# Patient Record
Sex: FEMALE | Race: WHITE | NOT HISPANIC OR LATINO | URBAN - METROPOLITAN AREA
[De-identification: names, ages, dates, MRNs, and addresses within clinical notes are randomized per-mention and may not be internally consistent; named-entity substitution may affect disease eponyms.]

---

## 2017-02-27 DIAGNOSIS — E11.65 TYPE 2 DIABETES MELLITUS WITH HYPERGLYCEMIA, WITHOUT LONG-TERM CURRENT USE OF INSULIN (HCC): ICD-10-CM

## 2017-02-27 RX ORDER — GLIPIZIDE 5 MG/1
5 TABLET ORAL 2 TIMES DAILY
Qty: 60 TAB | Refills: 6 | Status: SHIPPED | OUTPATIENT
Start: 2017-02-27 | End: 2017-02-27 | Stop reason: SDUPTHER

## 2017-02-27 RX ORDER — GLIPIZIDE 5 MG/1
5 TABLET ORAL 2 TIMES DAILY
Qty: 180 TAB | Refills: 3 | Status: SHIPPED | OUTPATIENT
Start: 2017-02-27 | End: 2017-09-29

## 2017-04-18 ENCOUNTER — OFFICE VISIT (OUTPATIENT)
Dept: ENDOCRINOLOGY | Facility: MEDICAL CENTER | Age: 52
End: 2017-04-18
Payer: MEDICAID

## 2017-04-18 VITALS
BODY MASS INDEX: 30.21 KG/M2 | HEART RATE: 82 BPM | WEIGHT: 215.8 LBS | DIASTOLIC BLOOD PRESSURE: 82 MMHG | OXYGEN SATURATION: 96 % | HEIGHT: 71 IN | SYSTOLIC BLOOD PRESSURE: 128 MMHG

## 2017-04-18 DIAGNOSIS — I10 ESSENTIAL HYPERTENSION: ICD-10-CM

## 2017-04-18 DIAGNOSIS — E66.9 OBESITY (BMI 30-39.9): ICD-10-CM

## 2017-04-18 DIAGNOSIS — E11.9 TYPE 2 DIABETES MELLITUS WITHOUT COMPLICATION, WITHOUT LONG-TERM CURRENT USE OF INSULIN (HCC): ICD-10-CM

## 2017-04-18 LAB
HBA1C MFR BLD: 5.7 % (ref ?–5.8)
INT CON NEG: NORMAL
INT CON POS: NORMAL

## 2017-04-18 PROCEDURE — 83036 HEMOGLOBIN GLYCOSYLATED A1C: CPT | Performed by: INTERNAL MEDICINE

## 2017-04-18 PROCEDURE — 99214 OFFICE O/P EST MOD 30 MIN: CPT | Performed by: INTERNAL MEDICINE

## 2017-04-18 NOTE — ADDENDUM NOTE
Addended by: MARIA DEL CARMEN OLMOS on: 4/18/2017 10:27 AM     Modules accepted: Mariana Carrasquillo

## 2017-04-18 NOTE — PROGRESS NOTES
Endocrinology Clinic Progress Note  PCP: Cristy Allen PA-C    CC:  Diabetes    HPI:  Thomas Shah is a 51 y.o. old patient who comes in today for routine follow up.     Type 2 diabetes: She is currently on Tradjenta 5 mg daily. Also takes glipizide 5 mg in the morning, she eats a very small breakfast and tends to have low blood sugar symptoms within a few hours of taking glipizide. She reports compliance with medications. She has been physically much more active than before.    Hypertension: Blood pressure is well controlled. She is on ARB.    Obesity: BMI greater than 30. She is currently not following diet and lifestyle modification. She has gained a few pounds since last visit.    ROS:  Constitutional: Positive for weight gain  Endo: Denies excessive thirst or frequent urination    Past Medical History:  Patient Active Problem List    Diagnosis Date Noted   • Smoking addiction 07/23/2013     Priority: High     Class: Acute   • Lung abnormality 07/23/2013     Priority: High     Class: Acute   • EMPHYSEMA 07/23/2013     Priority: High     Class: Acute   • Diabetes mellitus with lactic acidosis without coma (CMS-[facility]) 12/05/2016   • BMI 31.0-31.9,adult 10/14/2013   • HTN (hypertension) 09/18/2013   • History of alcoholism (CMS-HCC) 08/29/2013   • Snoring 08/01/2013   • Hypertension 08/01/2013   • Insomnia with sleep apnea 08/01/2013       Medications:    Current outpatient prescriptions:   •  glipiZIDE (GLUCOTROL) 5 MG Tab, Take 1 Tab by mouth 2 times a day., Disp: 180 Tab, Rfl: 3  •  linagliptin (TRADJENTA) 5 MG Tab tablet, Take 1 Tab by mouth every day., Disp: 90 Tab, Rfl: 2  •  Blood Glucose Monitoring Suppl SUPPLIES Misc, Test strips for meter. Sig: use four times daily ssx high or low sugar #100 RF x 3, Disp: 100 Each, Rfl: 6  •  Lancets Misc, Lancets order: Lancets for meter. Sig: use four times daily ssx high or low sugar. #100 RF x 3, Disp: 100 Each, Rfl: 5  •  Blood Glucose Monitoring Suppl  Device, Meter: Dispense Device of Insurance Preference. Sig. Use as directed for blood sugar monitoring. #1. NR., Disp: 1 Device, Rfl: 0  •  amlodipine (NORVASC) 10 MG Tab, Take 1 Tab by mouth every day., Disp: 30 Tab, Rfl: 11  •  albuterol (ACCUNEB) 0.63 MG/3ML nebulizer solution, 3 mL by Nebulization route every four hours as needed for Shortness of Breath., Disp: 75 mL, Rfl: 0  •  Blood Glucose Monitoring Suppl (ONE TOUCH ULTRA 2) W/DEVICE Kit, , Disp: , Rfl: 0  •  ONE TOUCH ULTRA TEST strip, , Disp: , Rfl: 5  •  fluticasone-salmeterol (ADVAIR HFA) 230-21 MCG/ACT inhaler, Inhale 2 Puffs by mouth 2 times a day., Disp: 12 g, Rfl: 3  •  losartan (COZAAR) 100 MG Tab, Take 100 mg by mouth every day., Disp: , Rfl:     Labs:   Results for JUSTIN QUILES (MRN [id number]) as of 4/18/2017 10:07   Ref. Range 3/15/2017 10:06 4/13/2017 11:30   Sodium Latest Ref Range: 136-145 mmol/L 139    Potassium Latest Ref Range: 3.5-5.1 mmol/L 4.2    Chloride Latest Ref Range:  mmol/L 104    Co2 Latest Ref Range: 21-32 mmol/L 25    Anion Gap Latest Ref Range: 10-18 mmol/L 14    Glucose Latest Ref Range: 74-99 mg/dL 126 (H)    Bun Latest Ref Range: 7-18 mg/dL 17    Creatinine Latest Ref Range: 0.6-1.0 mg/dL 0.8    GFR If  Latest Ref Range: >60 mL/min/1.73 m 2 >60    GFR If Non  Latest Ref Range: >60 mL/min/1.73 m 2 >60    Calcium Latest Ref Range: 8.5-11.0 mg/dL 9.3    Cholesterol,Tot Latest Ref Range: 120-200 mg/dL 177    Triglycerides Latest Ref Range: 0-150 mg/dL 174 (H)    HDL Latest Ref Range: 40.0-60.0 mg/dL 47.0    Non HDL Cholesterol Latest Ref Range:   130    LDL Latest Ref Range: <100 mg/dL 95    Chol-Hdl Ratio Unknown 3.77    Misc Reference Test Ambient Unknown  See Below   Misc Referent Test Frozen Unknown See Below    Misc Reference Test Refrigerated Unknown See Below    C-Peptide Latest Ref Range: 0.80-3.85 ng/mL 3.37    TSH Latest Ref Range: 0.34-4.82 uIU/mL 1.91    Free T-4  "Latest Ref Range: 0.77-1.61 ng/dL 0.83      Physical Examination:  Vital signs: /82 mmHg  Pulse 82  Ht 1.803 m (5' 11\")  Wt 97.886 kg (215 lb 12.8 oz)  BMI 30.11 kg/m2  SpO2 96%  LMP 12/26/2014  General: No apparent distress, cooperative  Eyes: No scleral icterus, no discharge, normal eyelids  Neck: No abnormal masses on inspection   Resp: Normal effort, clear to auscultation bilaterally  CVS: Regular rate and rhythm, S1 S2 normal, no murmur  Extremities: No lower extremity edema  Musculoskeletal: Normal digits and nails  Skin: No rash on visible skin  Psych: Alert and oriented, normal mood and affect    Assessment and Plan:    1. Type 2 diabetes mellitus without complication, without long-term current use of insulin (CMS-[facility])  · Hemoglobin A1c today in the clinic is 5.7%  · Goal A1c less than 7%  · Due to hypoglycemia she will discontinue glipizide for now and continue Tradjenta 5 mg daily  · If blood sugars are running persistently higher than 150 she will resume glipizide at a lower dose of 2.5 mg but rather than taking it in the morning she will take it with the largest meal of the day  · Repeat labs in 6 months  - HEMOGLOBIN A1C; Future  - BASIC METABOLIC PANEL; Future    2. Essential hypertension  · Blood pressure is well controlled  · Continue ARB    3. Obesity (BMI 30-39.9)  · We discussed about the importance of diet and lifestyle modification and benefits of achieving normal BMI    Return in about 6 months (around 10/18/2017).    Thank you for allowing me to participate in the care of this patient.    Elizabeth Nicholson M.D.    CC:   Cristy Allen PA-C    This note was created using voice recognition software (Dragon). The accuracy of the dictation is limited by the abilities of the software. I have reviewed the note prior to signing, however some errors in grammar and context are still possible. If you have any questions related to this note please do not hesitate to contact our office. "

## 2017-04-18 NOTE — MR AVS SNAPSHOT
"Thomas Verdin Pablo   2017 10:00 AM   Office Visit   MRN: [id number]    Department:  Endocrinology [facility]   Dept Phone:  [phone]    Description:  Female : 1965   Provider:  Elizabeth Nicholson M.D.           Reason for Visit     Follow-Up Diabetes      Allergies as of 2017     No Known Allergies      You were diagnosed with     Type 2 diabetes mellitus without complication, without long-term current use of insulin (CMS-HCC)   [1044975]         Vital Signs     Blood Pressure Pulse Height Weight Body Mass Index Oxygen Saturation    128/82 mmHg 82 1.803 m (5' 11\") 97.886 kg (215 lb 12.8 oz) 30.11 kg/m2 96%    Last Menstrual Period Smoking Status                2014 Current Every Day Smoker          Basic Information     Date Of Birth Sex Race Ethnicity Preferred Language    1965 Female White Non- English      Your appointments     Oct 18, 2017  9:20 AM   Established Patient with Elizabeth Nicholson M.D.   [facility]    [address], [address]  [facility] [id number] [phone]           You will be receiving a confirmation call a few days before your appointment from our automated call confirmation system.              Problem List              ICD-10-CM Priority Class Noted - Resolved    Smoking addiction F17.200 High Acute 2013 - Present    Lung abnormality J98.4 High Acute 2013 - Present    EMPHYSEMA  High Acute 2013 - Present    Snoring R06.83   2013 - Present    Hypertension I10   2013 - Present    Insomnia with sleep apnea G47.00, G47.30   2013 - Present    History of alcoholism (CMS-HCC) F10.21   2013 - Present    HTN (hypertension) I10   2013 - Present    BMI 31.0-31.9,adult Z68.31   10/14/2013 - Present    Diabetes mellitus with lactic acidosis without coma (CMS-HCC) E13.10   2016 - Present      Health Maintenance        Date Due Completion Dates    IMM HEP B VACCINE (1 of 3 - " Primary Series) 1965 ---    DIABETES MONOFILAMENT / LE EXAM 2/8/1965 ---    URINE ACR / MICROALBUMIN 8/8/1982 ---    IMM DTaP/Tdap/Td Vaccine (1 - Tdap) 8/8/1983 ---    IMM PNEUMOCOCCAL 19-64 (ADULT) MEDIUM RISK SERIES (1 of 1 - PPSV23) 8/8/1983 ---    COLONOSCOPY 8/8/2014 ---    PAP SMEAR 11/7/2017 (Originally 8/8/1985) ---    A1C SCREENING 6/8/2017 12/8/2016, 11/8/2016, 1/11/2016, 1/11/2016    MAMMOGRAM 11/11/2017 11/11/2016, 12/16/2014    RETINAL SCREENING 11/16/2017 11/16/2016 (Done)    Override on 11/16/2016: Done (20/20 both eyes. F/u 1 year with Trisha Cha OD)    FASTING LIPID PROFILE 3/15/2018 3/15/2017, 11/19/2015    SERUM CREATININE 3/15/2018 3/15/2017, 11/21/2016, 11/7/2016, 11/19/2015, 6/3/2014, 1/12/2014            Current Immunizations     Influenza TIV (IM) 10/14/2013    Influenza Vaccine Quad Inj (Pf) 10/26/2016      Below and/or attached are the medications your provider expects you to take. Review all of your home medications and newly ordered medications with your provider and/or pharmacist. Follow medication instructions as directed by your provider and/or pharmacist. Please keep your medication list with you and share with your provider. Update the information when medications are discontinued, doses are changed, or new medications (including over-the-counter products) are added; and carry medication information at all times in the event of emergency situations     Allergies:  No Known Allergies          Medications  Valid as of: April 18, 2017 - 10:03 AM    Generic Name Brand Name Tablet Size Instructions for use    Albuterol Sulfate (Nebu Soln) ACCUNEB 0.63 MG/3ML 3 mL by Nebulization route every four hours as needed for Shortness of Breath.        AmLODIPine Besylate (Tab) NORVASC 10 MG Take 1 Tab by mouth every day.        Azithromycin (Tab) ZITHROMAX 250 MG Use as directed        Blood Glucose Monitoring Suppl (Device) Blood Glucose Monitoring Suppl  Meter: Dispense Device of Insurance  Preference. Sig. Use as directed for blood sugar monitoring. #1. NR.        Blood Glucose Monitoring Suppl (Misc) Blood Glucose Monitoring Suppl SUPPLIES Test strips for meter. Sig: use four times daily ssx high or low sugar #100 RF x 3        Blood Glucose Monitoring Suppl (Kit) ONE TOUCH ULTRA 2 W/DEVICE         Doxycycline Hyclate (Tab) VIBRAMYCIN 100 MG Take 1 Tab by mouth 2 times a day.        Fluticasone-Salmeterol (Aerosol) ADVAIR -21 MCG/ACT Inhale 2 Puffs by mouth 2 times a day.        GlipiZIDE (Tab) GLUCOTROL 5 MG Take 1 Tab by mouth 2 times a day.        Glucose Blood (Strip) ONE TOUCH ULTRA TEST          Hydrocodone-Acetaminophen (Tab) NORCO 5-325 MG Take 1-2 Tabs by mouth every four hours as needed.        Lancets (Misc) Lancets  Lancets order: Lancets for meter. Sig: use four times daily ssx high or low sugar. #100 RF x 3        Linagliptin (Tab) TRADJENTA 5 MG Take 1 Tab by mouth every day.        Losartan Potassium (Tab) COZAAR 100 MG Take 100 mg by mouth every day.        Methocarbamol (Tab) ROBAXIN 500 MG Take 2 Tabs by mouth 3 times a day.        Omeprazole (CAPSULE DELAYED RELEASE) PRILOSEC 20 MG Take 1 Cap by mouth every day.        Ondansetron HCl (Tab) ZOFRAN 4 MG Take 1 Tab by mouth every 8 hours as needed for Nausea/Vomiting.        PredniSONE (Tab) DELTASONE 20 MG 3 PO daily for five days        Promethazine-Codeine (Syrup) PHENERGAN-CODEINE 6.25-10 MG/5ML Take 5 mL by mouth 4 times a day as needed for Cough.        .                 Medicines prescribed today were sent to:     [facility] #[id number] - [place], [place] - [address]    [address]  [facility] [address]    Phone: [phone] Fax: [phone]    Open 24 Hours?: No    [facility] - [place], [place] - [address]    [address] [place] [place] [address]    Phone: [phone] Fax: [phone]    Open 24 Hours?: No      Medication refill instructions:       If your prescription bottle indicates  you have medication refills left, it is not necessary to call your provider’s office. Please contact your pharmacy and they will refill your medication.    If your prescription bottle indicates you do not have any refills left, you may request refills at any time through one of the following ways: The online [employer] system (except Urgent Care), by calling your provider’s office, or by asking your pharmacy to contact your provider’s office with a refill request. Medication refills are processed only during regular business hours and may not be available until the next business day. Your provider may request additional information or to have a follow-up visit with you prior to refilling your medication.   *Please Note: Medication refills are assigned a new Rx number when refilled electronically. Your pharmacy may indicate that no refills were authorized even though a new prescription for the same medication is available at the pharmacy. Please request the medicine by name with the pharmacy before contacting your provider for a refill.        Your To Do List     Future Labs/Procedures Complete By Expires    BASIC METABOLIC PANEL  As directed 4/18/2019    HEMOGLOBIN A1C  As directed 4/18/2018         [employer] Status: Patient Declined        Quit Tobacco Information     Do you want to quit using tobacco?    Quitting tobacco decreases risks of cancer, heart and lung disease, increases life expectancy, improves sense of taste and smell, and increases spending money, among other benefits.    If you are thinking about quitting, we can help.  • [facility] Quit Tobacco Program: 656.368.7672  o Program occurs weekly for four weeks and includes pharmacist consultation on products to support quitting smoking or chewing tobacco. A provider referral is needed for pharmacist consultation.  • Tobacco Users Help Hotline: 1-141-QUIT-NOW (456-0730) or https://[place].quitlogix.org/  o Free, confidential telephone and online coaching for  [facility]. Sessions are designed on a schedule that is convenient for you. Eligible clients receive free nicotine replacement therapy.  • Nationally: www.smokefree.gov  o Information and professional assistance to support both immediate and long-term needs as you become, and remain, a non-smoker. Smokefree.gov allows you to choose the help that best fits your needs.

## 2017-09-29 ENCOUNTER — HOSPITAL ENCOUNTER (OUTPATIENT)
Dept: LAB | Facility: MEDICAL CENTER | Age: 52
End: 2017-09-29
Attending: INTERNAL MEDICINE
Payer: MEDICAID

## 2017-09-29 ENCOUNTER — TELEPHONE (OUTPATIENT)
Dept: ENDOCRINOLOGY | Facility: MEDICAL CENTER | Age: 52
End: 2017-09-29

## 2017-09-29 ENCOUNTER — OFFICE VISIT (OUTPATIENT)
Dept: ENDOCRINOLOGY | Facility: MEDICAL CENTER | Age: 52
End: 2017-09-29
Payer: MEDICAID

## 2017-09-29 VITALS
DIASTOLIC BLOOD PRESSURE: 72 MMHG | BODY MASS INDEX: 30.85 KG/M2 | SYSTOLIC BLOOD PRESSURE: 118 MMHG | WEIGHT: 220.4 LBS | HEIGHT: 71 IN | HEART RATE: 97 BPM | OXYGEN SATURATION: 100 %

## 2017-09-29 DIAGNOSIS — E11.9 TYPE 2 DIABETES MELLITUS WITHOUT COMPLICATION, WITHOUT LONG-TERM CURRENT USE OF INSULIN (HCC): ICD-10-CM

## 2017-09-29 DIAGNOSIS — E78.1 HYPERTRIGLYCERIDEMIA: ICD-10-CM

## 2017-09-29 DIAGNOSIS — E66.9 OBESITY (BMI 30.0-34.9): ICD-10-CM

## 2017-09-29 DIAGNOSIS — I10 ESSENTIAL HYPERTENSION: ICD-10-CM

## 2017-09-29 LAB
ALBUMIN SERPL BCP-MCNC: 4.1 G/DL (ref 3.2–4.9)
ALP SERPL-CCNC: 93 U/L (ref 30–99)
ALT SERPL-CCNC: 45 U/L (ref 2–50)
AST SERPL-CCNC: 26 U/L (ref 12–45)
BILIRUB CONJ SERPL-MCNC: 0.2 MG/DL (ref 0.1–0.5)
BILIRUB INDIRECT SERPL-MCNC: 0.7 MG/DL (ref 0–1)
BILIRUB SERPL-MCNC: 0.9 MG/DL (ref 0.1–1.5)
PROT SERPL-MCNC: 6.8 G/DL (ref 6–8.2)

## 2017-09-29 PROCEDURE — 80076 HEPATIC FUNCTION PANEL: CPT

## 2017-09-29 PROCEDURE — 36415 COLL VENOUS BLD VENIPUNCTURE: CPT

## 2017-09-29 PROCEDURE — 99214 OFFICE O/P EST MOD 30 MIN: CPT | Performed by: INTERNAL MEDICINE

## 2017-09-29 RX ORDER — ATORVASTATIN CALCIUM 20 MG/1
20 TABLET, FILM COATED ORAL
Qty: 90 TAB | Refills: 3 | Status: SHIPPED | OUTPATIENT
Start: 2017-09-29 | End: 2017-10-20

## 2017-09-29 NOTE — TELEPHONE ENCOUNTER
Please inform patient that liver enzymes labs are not back yet. However as her insurance changes end of this month I am just sending a prescription for Lipitor now, she should  the Lipitor prescription by tomorrow but do not start Lipitor yet. We will contact her on Monday after reviewing lab results.

## 2017-09-29 NOTE — PROGRESS NOTES
"Endocrinology Clinic Progress Note    CC: Diabetes     HPI:  Thomas Shah is a 52 y.o. old patient who comes in today for routine follow up.     Type 2 diabetes: She is currently on Tradjenta 5 mg daily. She is off glipizide for several months. She has not been checking blood sugars as often. Denies hypoglycemic symptoms.     Hypertension: Blood pressure is well controlled. She is on ARB.    Obesity: She has gained a few pounds since last visit. She is to drinking 3 cans of regular soda and eating potato chips every day for lunch.    ROS:  Constitutional: Positive for weight gain  Endo: Denies excessive thirst or frequent urination    PMH:  Type 2 diabetes  Hypertriglyceridemia  Hypertension  Obesity    EXAM:  Vital signs: /72   Pulse 97   Ht 1.803 m (5' 11\")   Wt 100 kg (220 lb 6.4 oz)   LMP 12/26/2014   SpO2 100%   BMI 30.74 kg/m²   General: No apparent distress, cooperative  Eyes: No scleral icterus, no discharge  Neck: Normal on external inspection  Resp: Normal effort, clear to auscultation bilaterally  CVS: Regular rate and rhythm, S1 S2 normal  Musculoskeletal: Normal gait  Extremities: No lower extremity edema  Skin: No rash on visible skin  Psych: Alert and oriented, normal mood and affect    Assessment and Plan:    1. Type 2 diabetes mellitus without complication, without long-term current use of insulin (CMS-[facility])  · Hemoglobin A1c today in the clinic is 6.3%  · Goal hemoglobin A1c less than 7%  · Continue tradjenta  · Discussed about the pros and cons of statins, she has history of elevated LFTs, we will check LFTs now and then again in 2 months after starting statin (depending upon LFTs now we will start statin)  - BASIC METABOLIC PANEL; Future  - TSH WITH REFLEX TO FT4; Future  - MICROALBUMIN CREAT RATIO URINE; Future  - LIPID PROFILE; Future  - HEMOGLOBIN A1C; Future  - HEPATIC FUNCTION PANEL; Future  - HEPATIC FUNCTION PANEL; Future    2. Essential hypertension  · Blood pressure is " well controlled  · Continue ARB    3. Obesity (BMI 30.0-34.9)  · We again discussed about the importance of weight loss and maintaining normal BMI    Return in about 6 months (around 3/29/2018).    Thank you for allowing me to participate in the care of this patient.    Elizabeth Nicholson M.D.    CC:   Cristy Allen P.A.-C.    This note was created using voice recognition software (Dragon). The accuracy of the dictation is limited by the abilities of the software. I have reviewed the note prior to signing, however some errors in grammar and context are still possible. If you have any questions related to this note please do not hesitate to contact our office.

## 2017-10-03 ENCOUNTER — TELEPHONE (OUTPATIENT)
Dept: ENDOCRINOLOGY | Facility: MEDICAL CENTER | Age: 52
End: 2017-10-03

## 2017-10-03 NOTE — TELEPHONE ENCOUNTER
----- Message from Elizabeth Nicholson M.D. sent at 10/3/2017 10:30 AM PDT -----    Called Pt and notified    Please inform patient that liver enzymes are normal. She can start cholesterol medication. Repeat labs in one month.    Thank you  Pat

## 2017-11-08 ENCOUNTER — TELEPHONE (OUTPATIENT)
Dept: ENDOCRINOLOGY | Facility: MEDICAL CENTER | Age: 52
End: 2017-11-08

## 2017-11-08 DIAGNOSIS — E11.65 TYPE 2 DIABETES MELLITUS WITH HYPERGLYCEMIA, WITHOUT LONG-TERM CURRENT USE OF INSULIN (HCC): ICD-10-CM

## 2017-11-08 NOTE — TELEPHONE ENCOUNTER
Pt called and she mentioned that her Insurance cost her to pay 300 dollars for her Tradjenta and she can't afford.    Pt need to know if you could change it with other medication.    Pt cant be on Metformin due to side effect.    Pls Advise    Thank you  Pat

## 2017-11-09 NOTE — TELEPHONE ENCOUNTER
We have 2 options: one, we can discontinue Tradjenta and resume glipizide but at a lower dose of 2.5 mg daily 30 minutes before breakfast. And the second option would be for her to call her insurance company to obtain a list of covered diabetes medications and then we can choose from the available options.

## 2017-12-18 ENCOUNTER — TELEPHONE (OUTPATIENT)
Dept: ENDOCRINOLOGY | Facility: MEDICAL CENTER | Age: 52
End: 2017-12-18

## 2017-12-18 DIAGNOSIS — R25.2 MUSCLE CRAMPS: ICD-10-CM

## 2017-12-18 NOTE — TELEPHONE ENCOUNTER
Patient notified, demonstrated understanding and advised her to go to urgent care or ED for further evaluation of her symptoms if she's in a lot of pain.

## 2017-12-18 NOTE — TELEPHONE ENCOUNTER
Pt called and she mentioned that she had a bad jesse horse on her ankle and can't walk.  Sharp pain from her feet to her legs.    Pt was on Tradjenta 5mg once a day and she mentioned that she don't have neuropathy.    Pt next appt to see you will be on 4/2/18.    Thank you  Pat

## 2017-12-18 NOTE — TELEPHONE ENCOUNTER
Tradjenta should not cause muscle cramps. However I have ordered some labs, I would recommend to do those labs as soon as she can.

## 2017-12-28 ENCOUNTER — TELEPHONE (OUTPATIENT)
Dept: ENDOCRINOLOGY | Facility: MEDICAL CENTER | Age: 52
End: 2017-12-28

## 2017-12-28 DIAGNOSIS — E11.65 TYPE 2 DIABETES MELLITUS WITH HYPERGLYCEMIA, WITHOUT LONG-TERM CURRENT USE OF INSULIN (HCC): ICD-10-CM

## 2017-12-28 RX ORDER — METFORMIN HYDROCHLORIDE 500 MG/1
1000 TABLET, EXTENDED RELEASE ORAL DAILY
Qty: 360 TAB | Refills: 1 | Status: SHIPPED | OUTPATIENT
Start: 2017-12-28 | End: 2018-01-08

## 2017-12-28 NOTE — TELEPHONE ENCOUNTER
Pt is requesting a refill for Metformin instead of Trajenta due to copay being $300. She has been out of medication for a few days now and would like a 90 day supply if possible. Has not been feeling well.

## 2018-01-05 ENCOUNTER — TELEPHONE (OUTPATIENT)
Dept: ENDOCRINOLOGY | Facility: MEDICAL CENTER | Age: 53
End: 2018-01-05

## 2018-01-05 DIAGNOSIS — E11.9 TYPE 2 DIABETES MELLITUS WITHOUT COMPLICATION, WITHOUT LONG-TERM CURRENT USE OF INSULIN (HCC): ICD-10-CM

## 2018-01-05 NOTE — TELEPHONE ENCOUNTER
Pt states that since starting Metformin she has been having diarrhea, nausea and stomach cramps. Would like to know if there is another alternative?

## 2018-01-08 RX ORDER — GLIPIZIDE 5 MG/1
2.5 TABLET ORAL DAILY
Qty: 45 TAB | Refills: 1 | Status: SHIPPED | OUTPATIENT
Start: 2018-01-08 | End: 2018-01-22 | Stop reason: SDUPTHER

## 2018-01-08 NOTE — TELEPHONE ENCOUNTER
In that the case we can discontinue metformin. Previously she was on glipizide, she had low blood sugars with 5 mg of glipizide once a day in the morning with breakfast. So we can start with half a tablet, 2.5 mg glipizide with breakfast.

## 2018-01-11 NOTE — TELEPHONE ENCOUNTER
Yes, please advise her to increase the dose to 5 mg with breakfast. If blood sugars are still high I would recommend to add another 5 mg glipizide 30 minutes before dinner as well. Also advise her to call us with blood sugar readings on Monday.

## 2018-01-22 RX ORDER — GLIPIZIDE 5 MG/1
TABLET ORAL
Qty: 90 TAB | Refills: 6 | Status: SHIPPED | OUTPATIENT
Start: 2018-01-22 | End: 2018-04-23

## 2018-01-22 NOTE — TELEPHONE ENCOUNTER
Please advise her to increase the dose of glipizide to 10 mg with dinner, and continue 5 mg with breakfast. Call us with blood sugar readings in one week. New prescription sent.

## 2018-04-19 ENCOUNTER — TELEPHONE (OUTPATIENT)
Dept: ENDOCRINOLOGY | Facility: MEDICAL CENTER | Age: 53
End: 2018-04-19

## 2018-04-19 NOTE — TELEPHONE ENCOUNTER
Patient states she has been feeling very tired and sick to her stomach for the past couple of days, states unable to get out of bed before yesterday 4/18/18.  States paramedics called and she had blood sugars greater than 300, refused to go to hospital due to no insurance.  States reinstated on Medicaid yesterday and wants to go back to taking Tradjenta as she states it was the only thing the helped control her blood sugars in the past.

## 2018-05-18 ENCOUNTER — OFFICE VISIT (OUTPATIENT)
Dept: ENDOCRINOLOGY | Facility: MEDICAL CENTER | Age: 53
End: 2018-05-18
Payer: MEDICAID

## 2018-05-18 VITALS
BODY MASS INDEX: 30.94 KG/M2 | DIASTOLIC BLOOD PRESSURE: 78 MMHG | OXYGEN SATURATION: 93 % | WEIGHT: 221 LBS | HEIGHT: 71 IN | SYSTOLIC BLOOD PRESSURE: 122 MMHG | HEART RATE: 107 BPM

## 2018-05-18 DIAGNOSIS — R20.2 TINGLING OF FACE: ICD-10-CM

## 2018-05-18 DIAGNOSIS — R20.0 NUMBNESS OF TONGUE: ICD-10-CM

## 2018-05-18 DIAGNOSIS — E11.9 TYPE 2 DIABETES MELLITUS WITHOUT COMPLICATION, WITHOUT LONG-TERM CURRENT USE OF INSULIN (HCC): ICD-10-CM

## 2018-05-18 DIAGNOSIS — I10 ESSENTIAL HYPERTENSION: ICD-10-CM

## 2018-05-18 DIAGNOSIS — E03.8 SUBCLINICAL HYPOTHYROIDISM: ICD-10-CM

## 2018-05-18 PROCEDURE — 99214 OFFICE O/P EST MOD 30 MIN: CPT | Performed by: INTERNAL MEDICINE

## 2018-05-18 RX ORDER — LEVOTHYROXINE SODIUM 0.07 MG/1
75 TABLET ORAL
Qty: 90 TAB | Refills: 2 | Status: SHIPPED | OUTPATIENT
Start: 2018-05-18 | End: 2019-04-22 | Stop reason: SDUPTHER

## 2018-05-18 RX ORDER — LOSARTAN POTASSIUM AND HYDROCHLOROTHIAZIDE 12.5; 5 MG/1; MG/1
1 TABLET ORAL DAILY
COMMUNITY
End: 2021-09-08

## 2018-05-18 NOTE — PROGRESS NOTES
"Endocrinology Clinic Progress Note    CC: Type 2 diabetes    HPI:  Thomas Shah is a 52 y.o. old patient who comes in today for routine follow up.     Type 2 diabetes: She is currently on Tradjenta 5 mg daily. She has not been checking blood sugars at home lately. Her hemoglobin A1c earlier this month was 6.7%. Her hemoglobin A1c over the past 2 years has been below 7%. She is trying to limit carbohydrate intake. She still drinks 2 regular sodas every day.     Subclinical hypothyroidism: Recent labs showed slightly elevated TSH. She complains of difficulty losing weight. She feels tired.    Hypertension: Blood pressure is well controlled. She is on ARB.    Numbness/tingling of lips/tongue: For the past month she has been experiencing significant numbness of left side of her tongue, left upper and lower lips. Recently she had issues with blurry vision in the right eye for several minutes, which resolved on its own. She also complains of burning pain in her feet.    ROS:  Constitutional: No unintentional weight loss  Endo: Denies excessive thirst or frequent urination    PMH:  Patient Active Problem List   Diagnosis   • Smoking addiction   • Lung abnormality   • EMPHYSEMA   • Snoring   • Hypertension   • Insomnia with sleep apnea   • History of alcoholism (HCC)   • HTN (hypertension)   • BMI 31.0-31.9,adult   • Diabetes mellitus with lactic acidosis without coma (HCC)     EXAM:  Vital signs: /78   Pulse (!) 107   Ht 1.803 m (5' 11\")   Wt 100.2 kg (221 lb)   LMP 12/26/2014   SpO2 93%   BMI 30.82 kg/m²   General: No apparent distress, cooperative  Eyes: No scleral icterus, no discharge  Neck: Normal on external inspection  Resp: Normal effort, clear to auscultation bilaterally  CVS: Regular rate and rhythm, S1 S2 normal  Musculoskeletal: Normal gait  Extremities: No lower extremity edema  Skin: No rash on visible skin  Psych: Alert and oriented, normal mood and affect  Feet: Dry skin, skin intact, " slightly impaired sensation to monofilament testing    Assessment and Plan:    1. Type 2 diabetes mellitus without complication, without long-term current use of insulin (HCC)  · Hemoglobin A1c earlier this month was 6.7%  · Goal A1c less than 7%  · Continue Tradjenta 5 mg daily  · Check blood sugars at least a few times a week  · We again discussed about benefits of weight loss, we discussed about limiting carbohydrate intake, we discussed about avoiding sugary beverages    2. Essential hypertension  · Blood pressure is well controlled  · Continue ARB    3. Subclinical hypothyroidism  · Started levothyroxine 75 µg daily  · Repeat labs in 6 weeks  - TSH; Future  - FREE THYROXINE; Future  - levothyroxine (SYNTHROID) 75 MCG Tab; Take 1 Tab by mouth Every morning on an empty stomach.  Dispense: 90 Tab; Refill: 2    4. Tingling of face  - REFERRAL TO NEUROLOGY    5. Numbness of tongue  - REFERRAL TO NEUROLOGY    Return in about 4 months (around 9/18/2018).    Thank you for allowing me to participate in the care of this patient.    Elizabeth Nicholson M.D.    CC:   Cristy Allen P.A.-C.    This note was created using voice recognition software (Dragon). The accuracy of the dictation is limited by the abilities of the software. I have reviewed the note prior to signing, however some errors in grammar and context are still possible. If you have any questions related to this note please do not hesitate to contact our office.

## 2018-06-05 ENCOUNTER — TELEPHONE (OUTPATIENT)
Dept: ENDOCRINOLOGY | Facility: MEDICAL CENTER | Age: 53
End: 2018-06-05

## 2018-06-05 NOTE — TELEPHONE ENCOUNTER
Referral to Neurology was not accepted according to pt. Says they are not accepting new pt's at this time. Pt would like to know if there are any other neurologists she can be referred to? Please advise.

## 2018-06-06 NOTE — TELEPHONE ENCOUNTER
Can you please ask which neurology clinic she received the call from; I see that referral was recently sent to [facility].      Referral information sent to the following:  Neurology     [facility]  Dr Shruthi Ewing  [id number]  [facility] [address]  Phone: [phone]  Fax: [phone]

## 2018-06-12 NOTE — TELEPHONE ENCOUNTER
Yes, that was the referral she was talking about. Says they dont accept medicaid and not taking new patients at this time. Would like to know if there is anyone she can see for the tingling and numbness in legs and lips meanwhile? Please advise.

## 2018-06-28 ENCOUNTER — HOSPITAL ENCOUNTER (OUTPATIENT)
Dept: LAB | Facility: MEDICAL CENTER | Age: 53
End: 2018-06-28
Attending: INTERNAL MEDICINE
Payer: MEDICAID

## 2018-06-28 ENCOUNTER — OFFICE VISIT (OUTPATIENT)
Dept: ENDOCRINOLOGY | Facility: MEDICAL CENTER | Age: 53
End: 2018-06-28
Payer: MEDICAID

## 2018-06-28 VITALS
WEIGHT: 212.6 LBS | BODY MASS INDEX: 29.76 KG/M2 | SYSTOLIC BLOOD PRESSURE: 122 MMHG | HEIGHT: 71 IN | DIASTOLIC BLOOD PRESSURE: 78 MMHG | OXYGEN SATURATION: 100 % | HEART RATE: 99 BPM

## 2018-06-28 DIAGNOSIS — I10 ESSENTIAL HYPERTENSION: ICD-10-CM

## 2018-06-28 DIAGNOSIS — E03.8 SUBCLINICAL HYPOTHYROIDISM: ICD-10-CM

## 2018-06-28 DIAGNOSIS — E87.6 HYPOKALEMIA: ICD-10-CM

## 2018-06-28 DIAGNOSIS — E11.65 TYPE 2 DIABETES MELLITUS WITH HYPERGLYCEMIA, WITHOUT LONG-TERM CURRENT USE OF INSULIN (HCC): ICD-10-CM

## 2018-06-28 LAB
ANION GAP SERPL CALC-SCNC: 11 MMOL/L (ref 0–11.9)
BUN SERPL-MCNC: 11 MG/DL (ref 8–22)
CALCIUM SERPL-MCNC: 8.9 MG/DL (ref 8.5–10.5)
CHLORIDE SERPL-SCNC: 102 MMOL/L (ref 96–112)
CO2 SERPL-SCNC: 22 MMOL/L (ref 20–33)
CREAT SERPL-MCNC: 0.65 MG/DL (ref 0.5–1.4)
GLUCOSE SERPL-MCNC: 453 MG/DL (ref 65–99)
POTASSIUM SERPL-SCNC: 3.7 MMOL/L (ref 3.6–5.5)
SODIUM SERPL-SCNC: 135 MMOL/L (ref 135–145)

## 2018-06-28 PROCEDURE — 80048 BASIC METABOLIC PNL TOTAL CA: CPT

## 2018-06-28 PROCEDURE — 84681 ASSAY OF C-PEPTIDE: CPT

## 2018-06-28 PROCEDURE — 99214 OFFICE O/P EST MOD 30 MIN: CPT | Performed by: INTERNAL MEDICINE

## 2018-06-28 PROCEDURE — 86341 ISLET CELL ANTIBODY: CPT | Mod: 91

## 2018-06-28 PROCEDURE — 36415 COLL VENOUS BLD VENIPUNCTURE: CPT

## 2018-06-28 RX ORDER — POTASSIUM CHLORIDE 20 MEQ/1
20 TABLET, EXTENDED RELEASE ORAL 2 TIMES DAILY
Qty: 60 TAB | Refills: 3 | Status: SHIPPED | OUTPATIENT
Start: 2018-06-28 | End: 2018-06-28 | Stop reason: SDUPTHER

## 2018-06-28 RX ORDER — GLIPIZIDE 5 MG/1
5 TABLET ORAL 2 TIMES DAILY
Qty: 180 TAB | Refills: 2 | Status: SHIPPED | OUTPATIENT
Start: 2018-06-28 | End: 2018-07-09

## 2018-06-28 RX ORDER — GLIPIZIDE 5 MG/1
5 TABLET ORAL 2 TIMES DAILY
Qty: 60 TAB | Refills: 3 | Status: SHIPPED | OUTPATIENT
Start: 2018-06-28 | End: 2018-06-28 | Stop reason: SDUPTHER

## 2018-06-28 RX ORDER — POTASSIUM CHLORIDE 20 MEQ/1
20 TABLET, EXTENDED RELEASE ORAL 2 TIMES DAILY
Qty: 180 TAB | Refills: 1 | Status: SHIPPED | OUTPATIENT
Start: 2018-06-28 | End: 2019-02-20

## 2018-06-28 NOTE — PATIENT INSTRUCTIONS
To whom it may concern,    Ms Shah is under my care for treatment for diabetes. For the past few days her blood sugar is running much higher than usual, we have made several changes to medications and have ordered lab work up. She may not be able to go to work for the next a few days. Please do not hesitate to contact our clinic in case you have any questions.     Elizabeth Nicholson M.D.  6/28/2018

## 2018-06-28 NOTE — PROGRESS NOTES
"Endocrinology Clinic Progress Note    CC: Diabetes    HPI:  1. Type 2 diabetes mellitus with hyperglycemia, without long-term current use of insulin ([facility])  She had a rather sudden worsening of her glycemic control, for the past 2 days her blood sugars have been in 200s to 400s. She had not been checking blood sugars until visit to the ER yesterday when her blood sugar was in 400s. She has lost weight over the past month. She has excessive thirst and frequent urination. He is currently on Tradjenta. She had regular soda this morning and blood sugar today in the clinic was in 400s again. Last month her A1c was 6.7% and 6 weeks later on repeat check in ER yesterday it was no 9%.    2. Essential hypertension  Blood pressure is well controlled.    3. Subclinical hypothyroidism  Recent TSH is normal, she is currently on levothyroxine 75 µg daily.    4. Hypokalemia  Labs yesterday showed potassium of 3.3. She is having muscle cramps for the past few weeks.    ROS:  Constitutional: Positive for weight loss  Endo: Positive for frequent thirst and urination    PMH:  Patient Active Problem List   Diagnosis   • Smoking addiction   • Lung abnormality   • EMPHYSEMA   • Snoring   • Hypertension   • Insomnia with sleep apnea   • History of alcoholism ([facility])   • HTN (hypertension)   • BMI 31.0-31.9,adult   • Diabetes mellitus with lactic acidosis without coma ([facility])     EXAM:  Vital signs: /78   Pulse 99   Ht 1.803 m (5' 11\")   Wt 96.4 kg (212 lb 9.6 oz)   LMP 12/26/2014   SpO2 100%   BMI 29.65 kg/m²   General: No apparent distress, cooperative  Eyes: No scleral icterus, no discharge  Neck: Normal on external inspection  Resp: Normal effort, clear to auscultation bilaterally  CVS: Regular rate and rhythm, S1 S2 normal  Musculoskeletal: Normal gait  Extremities: No lower extremity edema  Skin: No rash on visible skin  Psych: Alert and oriented, normal mood and affect    Assessment and Plan:    1. Type 2 diabetes mellitus " with hyperglycemia, without long-term current use of insulin (HCC)  · With this rapid worsening of glycemic control we will rule out type 1 diabetes  · Previously she had quite significant drop in her blood sugars even small doses of glipizide, so we are starting glipizide 5 mg twice a day, plus she has had issues with drug cost in the past so we are not trying newer agents at this time  · Advised to start checking blood sugars 3 times a day  · Advised to avoid carbs, no sugary beverages, discussed about eating salad/vegetables/chicken/turkey/fish  - BASIC METABOLIC PANEL; Future  - C-PEPTIDE; Future  - MISCELLANEOUS TEST; Future  - NAKITA-65; Future  - ONE TOUCH ULTRA TEST strip; For BG check 3 times a day  Dispense: 100 Strip; Refill: 6  - linagliptin (TRADJENTA) 5 MG Tab tablet; Take 1 Tab by mouth every day.  Dispense: 90 Tab; Refill: 2  - glipiZIDE (GLUCOTROL) 5 MG Tab; Take 1 Tab by mouth 2 times a day.  Dispense: 180 Tab; Refill: 2    2. Essential hypertension    3. Subclinical hypothyroidism    4. Hypokalemia  - potassium chloride SA (KDUR) 20 MEQ Tab CR; Take 1 Tab by mouth 2 times a day.  Dispense: 180 Tab; Refill: 1    Return in about 2 weeks (around 7/12/2018).    Thank you for allowing me to participate in the care of this patient.    Elizabeth Nicholson M.D.    CC:   Cristy Allen P.A.-C.    This note was created using voice recognition software (Dragon). The accuracy of the dictation is limited by the abilities of the software. I have reviewed the note prior to signing, however some errors in grammar and context are still possible. If you have any questions related to this note please do not hesitate to contact our office.

## 2018-06-28 NOTE — PROGRESS NOTES
RN-CDE Note    Subjective:     Health changes since last visit/interval Hx: Went to ER on 6/27/18 due to chest pain that started after eating a spicy cheeto the day before.  Cardiac ruled out in ER.   Blood sugars were greater than 400 in ER, she was given some insulin at that time.   A1c elevated at 9% which is up from 6.7 last month.     Medications (including changes made today)  Current Outpatient Prescriptions   Medication Sig Dispense Refill   • cyclobenzaprine (FLEXERIL) 10 MG Tab Take 1 Tab by mouth 3 times a day as needed. 30 Tab 0   • losartan-hydrochlorothiazide (HYZAAR) 50-12.5 MG per tablet Take 1 Tab by mouth every day.     • levothyroxine (SYNTHROID) 75 MCG Tab Take 1 Tab by mouth Every morning on an empty stomach. 90 Tab 2   • escitalopram (LEXAPRO) 20 MG tablet Take 1 Tab by mouth every day. 90 Tab 1   • linagliptin (TRADJENTA) 5 MG Tab tablet Take 1 Tab by mouth every day. 30 Tab 3   • ipratropium-albuterol (DUONEB) 0.5-2.5 (3) MG/3ML nebulizer solution 3 mL by Nebulization route every 6 hours as needed for Shortness of Breath. 30 Vial 0   • albuterol (ACCUNEB) 0.63 MG/3ML nebulizer solution 3 mL by Nebulization route every four hours as needed for Shortness of Breath. 75 mL 0   • Blood Glucose Monitoring Suppl SUPPLIES Misc Test strips for meter. Sig: use four times daily ssx high or low sugar #100 RF x 3 100 Each 6   • Lancets Misc Lancets order: Lancets for meter. Sig: use four times daily ssx high or low sugar. #100 RF x 3 100 Each 5   • Blood Glucose Monitoring Suppl (ONE TOUCH ULTRA 2) W/DEVICE Kit   0   • ONE TOUCH ULTRA TEST strip   5   • Blood Glucose Monitoring Suppl Device Meter: Dispense Device of Insurance Preference. Sig. Use as directed for blood sugar monitoring. #1. NR. 1 Device 0   • amlodipine (NORVASC) 10 MG Tab Take 1 Tab by mouth every day. 30 Tab 11   • fluticasone-salmeterol (ADVAIR HFA) 230-21 MCG/ACT inhaler Inhale 2 Puffs by mouth 2 times a day. 12 g 3     No current  "facility-administered medications for this visit.        Taking daily ASA:   Taking above medications as prescribed: yes  SIDE EFFECTS: Patient denies side effects to medications    Exercise: no regular exercise, sedentary  Diet: \"healthy\" diet  in general  Fast food about 3 times per week  Patient's body mass index is unknown because there is no height or weight on file. Exercise and nutrition counseling were performed at this visit.      Health Maintenance:   There are no preventive care reminders to display for this patient.    Immunizations:   PPSV23: Up-to-date  Khedjey23: N/A  Tdap: Up-to-date  Flu: Up-to-date  Hep B: not asked    DM:   Last A1c:   Lab Results   Component Value Date/Time    HBA1C 9.0 (H) 06/27/2018 02:47 AM      A1C GOAL: < 7    Glucose monitoring frequency: had not been checking her blood sugars prior to ER visit, blood sugars in hospital   Results for JUSTIN QUILES (MRN [id number]) as of 6/28/2018 10:57   Ref. Range 6/27/2018 00:25 6/27/2018 01:44 6/27/2018 02:25 6/27/2018 03:34   Glucose - Accu-Ck Latest Ref Range: 70 - 100 mg/dL 409 (A) 357 (A) 356 (A) 313 (A)     tested her blood sugar about 1.5 hours ago and states it was 458  Hypoglycemic episodes: no    Last Retinal Exam: on file and up-to-date  Daily Foot Exam: Yes   Routine Dental Exams: Yes    Lab Results   Component Value Date/Time    MALBCRT 7 05/09/2018 07:19 AM    MICROALBUR 4.8 05/09/2018 07:19 AM        ACR Albumin/Creatinine Ratio goal <30     HTN:   Blood pressure goal <140/<80 at goal.   Currently Rx ACE/ARB: Yes    Dyslipidemia:    Lab Results   Component Value Date/Time    CHOLSTRLTOT 176 05/09/2018 07:19 AM    LDL 85 05/09/2018 07:19 AM    HDL 34.0 (L) 05/09/2018 07:19 AM    TRIGLYCERIDE 287 (H) 05/09/2018 07:19 AM       Lab Results   Component Value Date/Time    SODIUM 139 06/27/2018 12:22 AM    POTASSIUM 3.3 (L) 06/27/2018 12:22 AM    CHLORIDE 103 06/27/2018 12:22 AM    CO2 19 (L) 06/27/2018 12:22 AM    GLUCOSE 413 " (H) 06/27/2018 12:22 AM    BUN 10 06/27/2018 12:22 AM    CREATININE 1.0 06/27/2018 12:22 AM     Lab Results   Component Value Date/Time    ALKPHOSPHAT 142 (H) 06/27/2018 12:22 AM    ASTSGOT 36 06/27/2018 12:22 AM    ALTSGPT 73 06/27/2018 12:22 AM    TBILIRUBIN 0.9 06/27/2018 12:22 AM        Currently Rx Statin: No    She  reports that she has been smoking Cigarettes.  She has a 25.00 pack-year smoking history. She has never used smokeless tobacco.    Objective:     Exam:  Monofilament: not done    Plan:

## 2018-06-29 LAB — C PEPTIDE SERPL-MCNC: 3 NG/ML (ref 0.8–3.5)

## 2018-06-30 LAB — ISLET CELL512 AB SER-ACNC: <0.8 U/ML (ref 0–0.8)

## 2018-07-01 LAB — GAD65 AB SER IA-ACNC: <5 IU/ML (ref 0–5)

## 2018-07-06 ENCOUNTER — TELEPHONE (OUTPATIENT)
Dept: ENDOCRINOLOGY | Facility: MEDICAL CENTER | Age: 53
End: 2018-07-06

## 2018-07-06 NOTE — TELEPHONE ENCOUNTER
Pt has been averaging blood sugar levels from 267-400 for the past week. Says she is still having bad leg cramping and potassium is not helping. Is scheduled to see Kenny 7/9/18 and will also go down to urgent care during the weekend to see if they can help with her pain. Is wondering if Physiatry will help? Please advise.

## 2018-07-06 NOTE — TELEPHONE ENCOUNTER
----- Message from Elizabeth Nicholson M.D. sent at 7/2/2018  6:36 AM PDT -----  Please inform patient that labs are negative for type 1 diabetes, lab results are consistent with diagnosis of type 2 diabetes.  Please inquire how her blood sugars have been over the past couple of days.

## 2018-07-09 ENCOUNTER — OFFICE VISIT (OUTPATIENT)
Dept: ENDOCRINOLOGY | Facility: MEDICAL CENTER | Age: 53
End: 2018-07-09
Payer: MEDICAID

## 2018-07-09 VITALS
HEIGHT: 71 IN | DIASTOLIC BLOOD PRESSURE: 80 MMHG | SYSTOLIC BLOOD PRESSURE: 116 MMHG | OXYGEN SATURATION: 94 % | HEART RATE: 102 BPM | WEIGHT: 210.2 LBS | BODY MASS INDEX: 29.43 KG/M2

## 2018-07-09 DIAGNOSIS — E11.10: ICD-10-CM

## 2018-07-09 DIAGNOSIS — F17.200 SMOKING ADDICTION: ICD-10-CM

## 2018-07-09 DIAGNOSIS — I10 ESSENTIAL HYPERTENSION: ICD-10-CM

## 2018-07-09 PROCEDURE — 99214 OFFICE O/P EST MOD 30 MIN: CPT | Performed by: PHYSICIAN ASSISTANT

## 2018-07-09 RX ORDER — LIRAGLUTIDE 6 MG/ML
1.8 INJECTION SUBCUTANEOUS DAILY
Qty: 3 PEN | Refills: 6 | Status: SHIPPED | OUTPATIENT
Start: 2018-07-09 | End: 2018-07-20 | Stop reason: SDUPTHER

## 2018-07-09 RX ORDER — EMPAGLIFLOZIN 25 MG/1
1 TABLET, FILM COATED ORAL DAILY
Qty: 30 TAB | Refills: 3 | COMMUNITY
Start: 2018-07-09 | End: 2018-07-24 | Stop reason: SDUPTHER

## 2018-07-09 RX ORDER — PIOGLITAZONEHYDROCHLORIDE 30 MG/1
30 TABLET ORAL DAILY
Qty: 30 TAB | Refills: 11 | Status: SHIPPED | OUTPATIENT
Start: 2018-07-09 | End: 2021-03-24

## 2018-07-09 NOTE — PROGRESS NOTES
New Patient Consult Note  Referred by: Cristy Allen P.A.-C.    Reason for consult: Diabetes Management Type 2    HPI:  Thomas Shah is a 52 y.o. old patient who is seeing us today for diabetes care.  This is a pleasant patient with diabetes and I appreciate the opportunity to participate in the care of this patient.  This is a new patient with me today.    Labs of 6/28/18 GFR >60, c-peptide 3.0, TSH 2.57, HbA1c 9.0    BG Diary:7/9/2018  In the AM: 365, 294, 242, 40, 276  In the , 326, 264, 338, 344    Has been Diabetic since T2 for the last year  Has a Glucagon pen at home: no    1. Diabetes mellitus with lactic acidosis without coma (HCC)  This is a new patient with me today on 7/9/18  She is a patient of our office  She is on:  1.  Tradjenta 5mg once a day  2.  Glipizide 5mg one tablet four a day    STOP:  1.  Tradjenta 5mg once a day  2.  Glipizide 5mg one tablet four a day    START:  1.  Victoza 0.6 one injection before bed  2.  Jardiance 25mg one a day  3.  Actos 30mg one pill a day    Metformin (States get very ill)    2. Essential hypertension  She is tired all the time and has pains in her legs.  Her potassium is 2 tenth below normal I can not believe this is the cause of the leg cramps and it is certainly not Diabetic neuropathy.  Id consider a MRI of the Lumbar sine but before that Id investigate SLEEP APNEA.  I think this with her glucose toxic state is the root of her issues.        ROS:   Constitutional: No change in weight , No fatigue, No night sweats.  HEENT: No Headache.  Eyes:  ++ blurred vision, No visual changes.  Cardiac: No chest pain, No palpitations.  Resp: No shortness of breath, No cough,   Gastro: No nausea or vomiting, No diarrhea.  Neuro: Denies numbness or tinging in bilateral feet or hands, and no loss of sensation.  Endo: No heat or cold intolerance.  : ++ polyuria, ++ polydipsia, No chronic UTI's.  Lower extremities: No lower leg edema bilateral.  All other  systems were reviewed and were negative.    Past Medical History:  Patient Active Problem List    Diagnosis Date Noted   • Smoking addiction 07/23/2013     Priority: High     Class: Acute   • Lung abnormality 07/23/2013     Priority: High     Class: Acute   • EMPHYSEMA 07/23/2013     Priority: High     Class: Acute   • Diabetes mellitus with lactic acidosis without coma (HCC) 12/05/2016   • BMI 31.0-31.9,adult 10/14/2013   • HTN (hypertension) 09/18/2013   • History of alcoholism (HCC) 08/29/2013   • Snoring 08/01/2013   • Hypertension 08/01/2013   • Insomnia with sleep apnea 08/01/2013       Past Surgical History:  Past Surgical History:   Procedure Laterality Date   • ACL RECONSTRUCTION W/ ALLOGRAFT  6/11/2014    Performed by Jamari Sherman M.D. at [facility]   • CHOLECYSTECTOMY     • ELBOW ARTHROSCOPY     • OTHER      left elbow surgery   • TONSILLECTOMY     • TUBAL COAGULATION LAPAROSCOPIC BILATERAL         Allergies:  Patient has no known allergies.    Social History:  Social History     Social History   • Marital status:      Spouse name: N/A   • Number of children: N/A   • Years of education: N/A     Occupational History   • Not on file.     Social History Main Topics   • Smoking status: Current Every Day Smoker     Packs/day: 1.00     Years: 25.00     Types: Cigarettes     Last attempt to quit: 3/20/2017   • Smokeless tobacco: Never Used   • Alcohol use No      Comment: social; not regular   • Drug use: No   • Sexual activity: Not on file     Other Topics Concern   • Not on file     Social History Narrative   • No narrative on file       Family History:  History reviewed. No pertinent family history.    Medications:    Current Outpatient Prescriptions:   •  pioglitazone (ACTOS) 30 MG Tab, Take 1 Tab by mouth every day., Disp: 30 Tab, Rfl: 11  •  VICTOZA 18 MG/3ML Solution Pen-injector injection, Inject 0.3 mL as instructed every day., Disp: 3 PEN, Rfl: 6  •  JARDIANCE 25 MG Tab, Take 1  "tablet by mouth every day., Disp: 30 Tab, Rfl: 3  •  ONE TOUCH ULTRA TEST strip, For BG check 3 times a day, Disp: 100 Strip, Rfl: 6  •  potassium chloride SA (KDUR) 20 MEQ Tab CR, Take 1 Tab by mouth 2 times a day., Disp: 180 Tab, Rfl: 1  •  cyclobenzaprine (FLEXERIL) 10 MG Tab, Take 1 Tab by mouth 3 times a day as needed., Disp: 30 Tab, Rfl: 0  •  losartan-hydrochlorothiazide (HYZAAR) 50-12.5 MG per tablet, Take 1 Tab by mouth every day., Disp: , Rfl:   •  levothyroxine (SYNTHROID) 75 MCG Tab, Take 1 Tab by mouth Every morning on an empty stomach., Disp: 90 Tab, Rfl: 2  •  escitalopram (LEXAPRO) 20 MG tablet, Take 1 Tab by mouth every day., Disp: 90 Tab, Rfl: 1  •  ipratropium-albuterol (DUONEB) 0.5-2.5 (3) MG/3ML nebulizer solution, 3 mL by Nebulization route every 6 hours as needed for Shortness of Breath., Disp: 30 Vial, Rfl: 0  •  albuterol (ACCUNEB) 0.63 MG/3ML nebulizer solution, 3 mL by Nebulization route every four hours as needed for Shortness of Breath., Disp: 75 mL, Rfl: 0  •  Blood Glucose Monitoring Suppl SUPPLIES Misc, Test strips for meter. Sig: use four times daily ssx high or low sugar #100 RF x 3, Disp: 100 Each, Rfl: 6  •  Lancets Misc, Lancets order: Lancets for meter. Sig: use four times daily ssx high or low sugar. #100 RF x 3, Disp: 100 Each, Rfl: 5  •  Blood Glucose Monitoring Suppl (ONE TOUCH ULTRA 2) W/DEVICE Kit, , Disp: , Rfl: 0  •  Blood Glucose Monitoring Suppl Device, Meter: Dispense Device of Insurance Preference. Sig. Use as directed for blood sugar monitoring. #1. NR., Disp: 1 Device, Rfl: 0  •  amlodipine (NORVASC) 10 MG Tab, Take 1 Tab by mouth every day., Disp: 30 Tab, Rfl: 11  •  fluticasone-salmeterol (ADVAIR HFA) 230-21 MCG/ACT inhaler, Inhale 2 Puffs by mouth 2 times a day., Disp: 12 g, Rfl: 3      Physical Examination:   Vital signs: /80   Pulse (!) 102   Ht 1.803 m (5' 11\")   Wt 95.3 kg (210 lb 3.2 oz)   LMP 12/26/2014   SpO2 94%   BMI 29.32 kg/m²   General: " No distress, cooperative, well dressed and well nourished.   Eyes: No scleral icterus or discharge, No hyposphagma  ENMT: Normal on external inspection of nose, lips, No nasal drainage   Neck: No abnormal masses on inspection  Resp: Normal effort, Bilateral clear to auscultation, No wheezing, No rales  CVS: Regular rate and rhythm, S1 S2 normal, No murmur. No gallop  Extremities: No edema bilateral extremities  Neuro: Alert and oriented  Skin: No rash, No Ulcers  Psych: Normal mood and affect    Assessment and Plan:    1. Diabetes mellitus with lactic acidosis without coma (HCC)    STOP:  1.  Tradjenta 5mg once a day  2.  Glipizide 5mg one tablet four a day    START:  1.  Victoza 0.6 one injection before bed  2.  Jardiance 25mg one a day  3.  Actos 30mg one pill a day    I will see her back in one week and if not doing a lot better I will start on a little basal insulin    2. Essential hypertension  Blood glucose log: Check BG in the morning when wake up, before lunch or dinner and before bed.  So three times a day.  Always bring BG diary to the next office visit.     She is tired all the time and has pains in her legs.  Her potassium is 2 tenth below normal I can not believe this is the cause of the leg cramps and it is certainly not Diabetic neuropathy.  Id consider a MRI of the Lumbar sine but before that Id investigate SLEEP APNEA.  I think this with her glucose toxic state is the root of her issues.      3.  Smoking daily, Dr. Pepper daily, and ETOH only so often.      4.  We discussed eating well balanced.  She was told in the past to stop all carbs.  This is not reasonable or obtainable.      The total time spent seeing this patient today face to face in consultation, and formulating an action plan for this visit was greater than 43 minutes. > Than 50% of this time was spent counseling, discussing problems documented above and below, coordinating care and answering questions by the physician assistant.  We  developed a diabetes care plan for this patient today.        Return in about 1 week (around 7/16/2018).    Blood glucose log: Check BG in the morning when wake up, before lunch or dinner and before bed.  So three times a day.  Always bring BG diary to the next office visit.     This patient during there office visit was started on new medication Victoza. Jardiance and Actos.  Side effects of new medications were discussed with the patient today in the office. The patient was supplied paperwork on this new medication.    Thank you kindly for allowing me to participate in the diabetes care plan for this patient.    Kenny Araujo PA-C, BC-[facility]  Board Certified - Advanced Diabetes Management  07/09/18    CC:   Cristy Allen P.A.-C.

## 2018-07-09 NOTE — LETTER
July 9, 2018         Patient: Thomas Shah   YOB: 1965   Date of Visit: 7/9/2018           To Whom it May Concern:    It is my medical opinion that Thomas Shah needed to be off on 7/6/18 and 7/7/18 for medical illness.      If you have any questions or concerns, please don't hesitate to call.        Sincerely,           Kenny Araujo P.A.-C.  Electronically Signed

## 2018-07-09 NOTE — LETTER
July 9, 2018       Patient: Thomas Shah   YOB: 1965   Date of Visit: 7/9/2018         To Whom It May Concern:    It is my medical opinion that Thomas Shah needed to be off on 7/13/18 and 7/14/18 for a medical illness    If you have any questions or concerns, please don't hesitate to call [phone]          Sincerely,          Kenny Araujo P.A.-C.  Electronically Signed

## 2018-07-17 ENCOUNTER — APPOINTMENT (OUTPATIENT)
Dept: ENDOCRINOLOGY | Facility: MEDICAL CENTER | Age: 53
End: 2018-07-17
Payer: MEDICAID

## 2018-07-20 NOTE — TELEPHONE ENCOUNTER
----- Message from Margie Bautista sent at 7/20/2018 12:27 PM PDT -----  Regarding: RX  PT called and asked if she could get some more Victoza and needles [facility] in [place], [place] to last her until her next appt. Thank you!

## 2018-07-23 ENCOUNTER — TELEPHONE (OUTPATIENT)
Dept: ENDOCRINOLOGY | Facility: MEDICAL CENTER | Age: 53
End: 2018-07-23

## 2018-07-23 RX ORDER — LIRAGLUTIDE 6 MG/ML
1.8 INJECTION SUBCUTANEOUS DAILY
Qty: 3 PEN | Refills: 6 | Status: SHIPPED | OUTPATIENT
Start: 2018-07-23 | End: 2019-03-26 | Stop reason: SDUPTHER

## 2018-07-23 NOTE — TELEPHONE ENCOUNTER
In the last note on the 20th patient also requested pen needles and those were never sent to the pharmacy. Can you please send pen needles to her [facility] [place] [facility].    Thank you.

## 2018-07-24 ENCOUNTER — APPOINTMENT (OUTPATIENT)
Dept: ENDOCRINOLOGY | Facility: MEDICAL CENTER | Age: 53
End: 2018-07-24
Payer: MEDICAID

## 2018-07-24 NOTE — TELEPHONE ENCOUNTER
Was the patient seen in the last year in this department? Yes     Does patient have an active prescription for medications requested? No     Received Request Via: Pharmacy     Refill jardiance 25mg

## 2018-07-25 RX ORDER — EMPAGLIFLOZIN 25 MG/1
1 TABLET, FILM COATED ORAL DAILY
Qty: 30 TAB | Refills: 3 | Status: SHIPPED | OUTPATIENT
Start: 2018-07-25 | End: 2018-12-06 | Stop reason: SDUPTHER

## 2018-07-26 ENCOUNTER — APPOINTMENT (OUTPATIENT)
Dept: ENDOCRINOLOGY | Facility: MEDICAL CENTER | Age: 53
End: 2018-07-26
Payer: MEDICAID

## 2018-08-16 ENCOUNTER — APPOINTMENT (OUTPATIENT)
Dept: ENDOCRINOLOGY | Facility: MEDICAL CENTER | Age: 53
End: 2018-08-16
Payer: MEDICAID

## 2018-12-06 RX ORDER — EMPAGLIFLOZIN 25 MG/1
TABLET, FILM COATED ORAL
Qty: 30 TAB | Refills: 3 | Status: SHIPPED | OUTPATIENT
Start: 2018-12-06 | End: 2018-12-20

## 2018-12-07 ENCOUNTER — TELEPHONE (OUTPATIENT)
Dept: ENDOCRINOLOGY | Facility: MEDICAL CENTER | Age: 53
End: 2018-12-07

## 2018-12-07 NOTE — TELEPHONE ENCOUNTER
1. Caller Name: Thomas Shah                                           Call Back Number: [phone] and [phone]         Patient approves a detailed voicemail message: yes    Patient called stating she changed her insurance and just received her new card, she now has ambetter insurance, patient has now ran out of medication and was informed by new insurance that in order to get refills we need proceed with Prior Auth, however we do not have her new insurance information, patient stated she will be faxing this information today so we can process it, I explained that since this might take a few days we could provide her with some samples if she would like to come to the clinic and pick them up, Thomas stated she is in [place] and is not able to come in to [facility], I explained we will sent the Prior Auth after receiving her information and will give her a call back when it has been sent.  ORQUIDEA

## 2018-12-14 ENCOUNTER — TELEPHONE (OUTPATIENT)
Dept: ENDOCRINOLOGY | Facility: MEDICAL CENTER | Age: 53
End: 2018-12-14

## 2018-12-14 NOTE — TELEPHONE ENCOUNTER
Patient called and stated that her PA for Jardiance was denied. She requested a appeal be implemented. I tried called [facility] to start the appeal process, and they had already closed. The patient stated that they are needing her A1C, and that they are wanting her on metformin. The patient can not take metformin, she stated that it makes her pass out. I am going to fax most recent OV Notes along with A1C results to [facility] 12/14/18.

## 2018-12-19 ENCOUNTER — TELEPHONE (OUTPATIENT)
Dept: ENDOCRINOLOGY | Facility: MEDICAL CENTER | Age: 53
End: 2018-12-19

## 2018-12-19 NOTE — TELEPHONE ENCOUNTER
1. Caller Name: Thomas                                         Call Back Number: [phone] (home) [phone] (work)        Patient approves a detailed voicemail message: no    Patient called saying that her jardiance was costing 473 per month. Called insurance comapny and she needs to be on either a tier1 or 2 those medications are Steglatro for tier 2 or glipizide or glyburide for tier 1. Please prescribe one of these things so patient can afford. She also mentioned not being on any pill at all and just taking the Victoza to see how her blood sugar numbers look.

## 2019-01-07 ENCOUNTER — APPOINTMENT (OUTPATIENT)
Dept: ENDOCRINOLOGY | Facility: MEDICAL CENTER | Age: 54
End: 2019-01-07
Payer: COMMERCIAL

## 2019-01-09 ENCOUNTER — TELEPHONE (OUTPATIENT)
Dept: ENDOCRINOLOGY | Facility: MEDICAL CENTER | Age: 54
End: 2019-01-09

## 2019-01-09 NOTE — TELEPHONE ENCOUNTER
1. Caller Name: Thomas Shah                                             Call Back Number: [phone]        Patient approves a detailed voicemail message: yes    Patient called stating her pharmacy still has not received the medication for STEGLATRO.  She needs it sent into the [facility] pharmacy.    I called [facility] pharmacy and lvm calling in STEGLATRO.    I called patient and lvm letting her know as well.     Ertugliflozin L-PyroglutamicAc (STEGLATRO) 15 MG Tab [[id number]]   Order Details   Dose: 1 tablet Route: Oral Frequency: DAILY   Dispense Quantity:  30 Tab Refills:  11 Fills remaining:  --           Sig: Take 1 tablet by mouth every day.          Written Date:  12/20/18 Expiration Date:  --     Start Date:  12/20/18 End Date:  --            Ordering Provider:  -- JENNIFER #:  -- NPI:  --    Authorizing Provider:  Kenny Araujo P.A.-C. JENNIFER #:  [id number] NPI:  [id number]    Ordering User:  Kenny Araujo P.A.-C.               Pharmacy:  [place] [id number] [address]

## 2019-01-10 NOTE — TELEPHONE ENCOUNTER
1. Caller Name: Thomas Shah                                             Call Back Number: [phone] (home) [phone] (work)        Patient approves a detailed voicemail message: N\A      I called patient to let her know that the Steglatro rx has been approved through her insurance. I called the pharmacy to let them know. She said the pharmacy has to order that medication. She has been without her medications for 1.5 weeks and is not feeling well. She is shaky and has headaches. She is not sure when the pharmacy will get the medication in stock. I suggested getting samples of Jardiance ( what she was previously on. Co-pay is too high). She said she lives far away and cannot make it. She is asking what she can do in the mean time.    Please advise

## 2019-01-11 ENCOUNTER — TELEPHONE (OUTPATIENT)
Dept: ENDOCRINOLOGY | Facility: MEDICAL CENTER | Age: 54
End: 2019-01-11

## 2019-01-11 RX ORDER — DAPAGLIFLOZIN 10 MG/1
1 TABLET, FILM COATED ORAL DAILY
Qty: 30 TAB | Refills: 11 | Status: SHIPPED | OUTPATIENT
Start: 2019-01-11 | End: 2019-06-26

## 2019-01-11 NOTE — TELEPHONE ENCOUNTER
1. Caller Name: Thomas Shah                                             Call Back Number: No c/b number left        Patient approves a detailed voicemail message: N\A    Patient called back again yesterday stating the pharmacy still has not receive the shipment for the steglatro rx. She is not sure what to do.    Please advise

## 2019-03-26 RX ORDER — LIRAGLUTIDE 6 MG/ML
1.8 INJECTION SUBCUTANEOUS DAILY
Qty: 3 PEN | Refills: 6 | Status: SHIPPED | OUTPATIENT
Start: 2019-03-26 | End: 2019-06-26

## 2019-03-26 NOTE — TELEPHONE ENCOUNTER
Was the patient seen in the last year in this department? Yes  **LOV 7/9/18**    Does patient have an active prescription for medications requested? Yes    Received Request Via: Pharmacy

## 2019-04-22 DIAGNOSIS — E03.8 SUBCLINICAL HYPOTHYROIDISM: ICD-10-CM

## 2019-04-22 RX ORDER — LEVOTHYROXINE SODIUM 0.07 MG/1
75 TABLET ORAL
Qty: 90 TAB | Refills: 2 | Status: SHIPPED | OUTPATIENT
Start: 2019-04-22 | End: 2019-06-17 | Stop reason: SDUPTHER

## 2019-04-22 NOTE — TELEPHONE ENCOUNTER
Was the patient seen in the last year in this department? Yes    Does patient have an active prescription for medications requested? No     Received Request Via: Pharmacy     Levothyroxine 75 mcg 90 tabs

## 2019-05-13 ENCOUNTER — TELEPHONE (OUTPATIENT)
Dept: ENDOCRINOLOGY | Facility: MEDICAL CENTER | Age: 54
End: 2019-05-13

## 2019-05-13 ENCOUNTER — APPOINTMENT (OUTPATIENT)
Dept: ENDOCRINOLOGY | Facility: MEDICAL CENTER | Age: 54
End: 2019-05-13
Payer: COMMERCIAL

## 2019-05-13 NOTE — TELEPHONE ENCOUNTER
Phone Number Called: [phone] (home)     Message: Called Pt and let her know that Kenny said that she does not have to change the dose based on the change of diet. She did sate that she is having some issues with fatigue and headaches but she is sick right now but she will talk to Kenny when she comes in for her next appointment.     Left Message for patient to call back: yes

## 2019-05-13 NOTE — TELEPHONE ENCOUNTER
Patient called to cancel her appointment due to having the flu and also stated that she is on a liquid only diet now and wants to know if she needs to take a lower dosage of her victoza due to not eating any actual foods.

## 2019-06-17 DIAGNOSIS — E03.8 SUBCLINICAL HYPOTHYROIDISM: ICD-10-CM

## 2019-06-17 RX ORDER — LEVOTHYROXINE SODIUM 0.07 MG/1
75 TABLET ORAL
Qty: 90 TAB | Refills: 2 | Status: SHIPPED | OUTPATIENT
Start: 2019-06-17 | End: 2021-09-08

## 2019-06-17 NOTE — TELEPHONE ENCOUNTER
VM RECEIVED 6/16/19 @ 12:46pm.    Pt states she is out of thyroid medication.  She has been out of meds x 5 days and states she was told by pharmacy that she did not have any refills remaining.    After review of pt chart it appears 9 months of synthroid medication was filled by [facility] in April but Rx was sent to [facility]. Pt is requesting RX be redirected to [facility] in [place].  [facility] has been removed from pt's RX list.     Please call pt when RX has been sent in. Pt would like to  RX tomorrow, [place] 6/18/19.

## 2019-06-17 NOTE — TELEPHONE ENCOUNTER
Was the patient seen in the last year in this department? Yes    Does patient have an active prescription for medications requested? Yes    Received Request Via: Patient     **Last office visit 7/9/18. Next appt scheduled for 6/26/19**

## 2019-06-26 ENCOUNTER — OFFICE VISIT (OUTPATIENT)
Dept: ENDOCRINOLOGY | Facility: MEDICAL CENTER | Age: 54
End: 2019-06-26
Payer: COMMERCIAL

## 2019-06-26 VITALS
HEIGHT: 71 IN | WEIGHT: 205 LBS | DIASTOLIC BLOOD PRESSURE: 82 MMHG | OXYGEN SATURATION: 92 % | BODY MASS INDEX: 28.7 KG/M2 | SYSTOLIC BLOOD PRESSURE: 126 MMHG | HEART RATE: 88 BPM

## 2019-06-26 DIAGNOSIS — I10 ESSENTIAL HYPERTENSION: ICD-10-CM

## 2019-06-26 DIAGNOSIS — E11.10: ICD-10-CM

## 2019-06-26 LAB
HBA1C MFR BLD: 5.8 % (ref 0–5.6)
INT CON NEG: NEGATIVE
INT CON POS: POSITIVE

## 2019-06-26 PROCEDURE — 83036 HEMOGLOBIN GLYCOSYLATED A1C: CPT | Performed by: PHYSICIAN ASSISTANT

## 2019-06-26 PROCEDURE — 99214 OFFICE O/P EST MOD 30 MIN: CPT | Performed by: PHYSICIAN ASSISTANT

## 2019-06-26 NOTE — PROGRESS NOTES
Return to office Patient Consult Note  Referred by: SHAMEKA Mueller    Reason for consult: Diabetes Management Type 2    HPI:  Thomas Shah is a 53 y.o. old patient who is seeing us today for diabetes care.  This is a pleasant patient with diabetes and I appreciate the opportunity to participate in the care of this patient.    Labs of 6/26/2019 HbA1c is 5.8  Labs of 6/28/18 GFR >60, c-peptide 3.0, TSH 2.57, HbA1c 9.0    BG Diary:6/26/2019  In the AM:  No log    1. Diabetes mellitus with lactic acidosis without coma (HCC)  This is a new patient with me today on 7/9/18 (see that note) today is only my second visit with this patient  She is a patient of our office  She is on:  1.  Tradjenta 5mg once a day  2.  Glipizide 5mg one tablet four a day     STOP:  1.  Tradjenta 5mg once a day  2.  Glipizide 5mg one tablet four a day     START:  1.  Victoza 1.2 one injection before bed ( not on)  2.  Farxiga 10mg one a day (stop)  3.  Actos 30mg one pill a day      Metformin (States get very ill)    2. Essential hypertension  This is stable today and no new changes are needed or required in today's visit      ROS:   Constitutional: No night sweats.  Eyes:  No visual changes.  Cardiac: No chest pain, No palpitations or racing heart rate.  Resp: No shortness of breath, No cough,   Gi: No Diarrhea      All other systems were reviewed and were/are negative.     Past Medical History:  Patient Active Problem List    Diagnosis Date Noted   • Smoking addiction 07/23/2013     Priority: High     Class: Acute   • Lung abnormality 07/23/2013     Priority: High     Class: Acute   • EMPHYSEMA 07/23/2013     Priority: High     Class: Acute   • Diabetes mellitus with lactic acidosis without coma (HCC) 12/05/2016   • BMI 31.0-31.9,adult 10/14/2013   • HTN (hypertension) 09/18/2013   • History of alcoholism (HCC) 08/29/2013   • Snoring 08/01/2013   • Hypertension 08/01/2013   • Insomnia with sleep apnea 08/01/2013       Past Surgical  History:  Past Surgical History:   Procedure Laterality Date   • ACL RECONSTRUCTION W/ ALLOGRAFT  6/11/2014    Performed by Jamari Sherman M.D. at SURGERY [facility]   • CHOLECYSTECTOMY     • ELBOW ARTHROSCOPY     • OTHER      left elbow surgery   • TONSILLECTOMY     • TUBAL COAGULATION LAPAROSCOPIC BILATERAL         Allergies:  Patient has no known allergies.    Social History:  Social History     Social History   • Marital status:      Spouse name: N/A   • Number of children: N/A   • Years of education: N/A     Occupational History   • Not on file.     Social History Main Topics   • Smoking status: Current Every Day Smoker     Packs/day: 1.00     Years: 25.00     Types: Cigarettes     Last attempt to quit: 3/20/2017   • Smokeless tobacco: Never Used   • Alcohol use No      Comment: social; not regular   • Drug use: No   • Sexual activity: No     Other Topics Concern   • Not on file     Social History Narrative   • No narrative on file       Family History:  Family History   Problem Relation Age of Onset   • Family history unknown: Yes       Medications:    Current Outpatient Prescriptions:   •  levothyroxine (SYNTHROID) 75 MCG Tab, Take 1 Tab by mouth Every morning on an empty stomach., Disp: 90 Tab, Rfl: 2  •  escitalopram (LEXAPRO) 20 MG tablet, , Disp: , Rfl: 0  •  metoclopramide (REGLAN) 5 MG tablet, , Disp: , Rfl:   •  ondansetron (ZOFRAN) 4 MG Tab tablet, Take  by mouth 4 times a day as needed., Disp: , Rfl: 1  •  Esomeprazole Magnesium (NEXIUM PO), Take  by mouth., Disp: , Rfl:   •  Ertugliflozin L-PyroglutamicAc (STEGLATRO) 15 MG Tab, Take 1 tablet by mouth every day., Disp: 30 Tab, Rfl: 11  •  Insulin Pen Needle (NOVOFINE PLUS) 32G X 4 MM Misc, 1 Applicator by Does not apply route every day. Using one needle tip with victoza per day, Disp: 100 Each, Rfl: 3  •  pioglitazone (ACTOS) 30 MG Tab, Take 1 Tab by mouth every day., Disp: 30 Tab, Rfl: 11  •  ONE TOUCH ULTRA TEST strip, For BG check 3  "times a day, Disp: 100 Strip, Rfl: 6  •  losartan-hydrochlorothiazide (HYZAAR) 50-12.5 MG per tablet, Take 1 Tab by mouth every day., Disp: , Rfl:   •  ipratropium-albuterol (DUONEB) 0.5-2.5 (3) MG/3ML nebulizer solution, 3 mL by Nebulization route every 6 hours as needed for Shortness of Breath., Disp: 30 Vial, Rfl: 0  •  albuterol (ACCUNEB) 0.63 MG/3ML nebulizer solution, 3 mL by Nebulization route every four hours as needed for Shortness of Breath., Disp: 75 mL, Rfl: 0  •  Blood Glucose Monitoring Suppl SUPPLIES Misc, Test strips for meter. Sig: use four times daily ssx high or low sugar #100 RF x 3, Disp: 100 Each, Rfl: 6  •  Lancets Misc, Lancets order: Lancets for meter. Sig: use four times daily ssx high or low sugar. #100 RF x 3, Disp: 100 Each, Rfl: 5  •  Blood Glucose Monitoring Suppl (ONE TOUCH ULTRA 2) W/DEVICE Kit, , Disp: , Rfl: 0  •  Blood Glucose Monitoring Suppl Device, Meter: Dispense Device of Insurance Preference. Sig. Use as directed for blood sugar monitoring. #1. NR., Disp: 1 Device, Rfl: 0  •  amlodipine (NORVASC) 10 MG Tab, Take 1 Tab by mouth every day., Disp: 30 Tab, Rfl: 11  •  fluticasone-salmeterol (ADVAIR HFA) 230-21 MCG/ACT inhaler, Inhale 2 Puffs by mouth 2 times a day., Disp: 12 g, Rfl: 3        Physical Examination:   Vital signs: /82 (BP Location: Left arm, Patient Position: Sitting)   Pulse 88   Ht 1.803 m (5' 11\")   Wt 93 kg (205 lb)   LMP 12/26/2014   SpO2 92%   BMI 28.59 kg/m²   General: No distress, cooperative, well dressed and well nourished.   Eyes: No scleral icterus or discharge, No hyposphagma  ENMT: Normal on external inspection of nose, lips, No nasal drainage   Neck: No abnormal masses on inspection  Resp: Normal effort, Bilateral clear to auscultation, No wheezing, No rales  CVS: Regular rate and rhythm, S1 S2 normal, No murmur. No gallop  Extremities: No edema bilateral extremities  Neuro: Alert and oriented  Skin: No rash, No Ulcers  Psych: Normal mood " and affect      Assessment and Plan:    1. Diabetes mellitus with lactic acidosis without coma (HCC)    START:  1.  Victoza 1.2 one injection before bed ( Stop)  2.  Farxiga 10mg one a day (stop)  3.  Actos 30mg one pill a day     She wants to just treat with natural stuff      2. Essential hypertension  This is stable today and no new changes are needed or required in today's visit    Return in about 3 months (around 9/26/2019).      Thank you kindly for allowing me to participate in the diabetes care plan for this patient.    Kenny Araujo PA-C, [facility]  Board Certified - Advanced Diabetes Management  06/26/19    CC:   SHAMEKA Mueller

## 2019-06-26 NOTE — PATIENT INSTRUCTIONS
START:  1.  Victoza 1.2 one injection before bed ( Stop)  2.  Farxiga 10mg one a day (stop)  3.  Actos 30mg one pill a day

## 2019-07-19 ENCOUNTER — TELEPHONE (OUTPATIENT)
Dept: ENDOCRINOLOGY | Facility: MEDICAL CENTER | Age: 54
End: 2019-07-19

## 2019-07-19 PROBLEM — R45.4 IRRITABILITY AND ANGER: Status: ACTIVE | Noted: 2019-07-19

## 2019-07-19 NOTE — TELEPHONE ENCOUNTER
VOICEMAIL    1. Caller Name: Thomas Shah                         Call Back Number:  [phone] (work)      2. Message: Patient would like all her endo medical records faxed to Jose Pruitt (fax # [phone]) who is now going to be her endocrinologist.    3. Patient approves office to leave a detailed voicemail/MyChart message: yes

## 2019-07-19 NOTE — TELEPHONE ENCOUNTER
Phone Number Called: [phone] (work)      Message: LVM letting patient know she needs to sign a [employer] form. I provided her with medical records phone and fax so she can follow up with them.    Left Message for patient to call back: no

## 2019-08-15 PROBLEM — K31.84 GASTROPARESIS: Status: ACTIVE | Noted: 2019-08-15

## 2019-09-24 ENCOUNTER — APPOINTMENT (OUTPATIENT)
Dept: ENDOCRINOLOGY | Facility: MEDICAL CENTER | Age: 54
End: 2019-09-24
Payer: COMMERCIAL

## 2019-11-14 PROBLEM — S06.0XAA CONCUSSION: Status: ACTIVE | Noted: 2019-11-14

## 2019-11-15 PROBLEM — M62.830 SPASM OF MUSCLE OF LOWER BACK: Status: ACTIVE | Noted: 2019-11-15

## 2019-11-15 PROBLEM — F07.81 POST CONCUSSIVE SYNDROME: Status: ACTIVE | Noted: 2019-10-17

## 2020-01-03 PROBLEM — G95.20 CERVICAL CORD COMPRESSION WITH MYELOPATHY (HCC): Status: ACTIVE | Noted: 2020-01-03

## 2020-03-11 PROBLEM — G44.329 CHRONIC POST-TRAUMATIC HEADACHE, NOT INTRACTABLE: Status: ACTIVE | Noted: 2020-03-11

## 2020-03-11 PROBLEM — M47.816 LUMBAR SPONDYLOSIS: Status: ACTIVE | Noted: 2020-03-11

## 2020-03-11 PROBLEM — M48.02 CERVICAL SPINAL STENOSIS: Status: ACTIVE | Noted: 2020-03-11

## 2021-03-24 PROBLEM — E55.9 HYPOVITAMINOSIS D: Status: ACTIVE | Noted: 2021-01-15

## 2021-03-24 PROBLEM — E53.8 VITAMIN B12 DEFICIENCY: Status: ACTIVE | Noted: 2021-01-15

## 2021-03-24 PROBLEM — I10 ESSENTIAL HYPERTENSION: Status: ACTIVE | Noted: 2021-01-15

## 2021-03-24 PROBLEM — E03.9 ACQUIRED HYPOTHYROIDISM: Status: ACTIVE | Noted: 2021-01-15

## 2021-03-24 PROBLEM — E78.2 MIXED HYPERLIPIDEMIA: Status: ACTIVE | Noted: 2021-01-15

## 2021-03-24 PROBLEM — Z79.4 TYPE 2 DIABETES MELLITUS WITH HYPERGLYCEMIA, WITH LONG-TERM CURRENT USE OF INSULIN (HCC): Status: ACTIVE | Noted: 2021-01-15

## 2021-03-24 PROBLEM — E11.65 TYPE 2 DIABETES MELLITUS WITH HYPERGLYCEMIA, WITH LONG-TERM CURRENT USE OF INSULIN (HCC): Status: ACTIVE | Noted: 2021-01-15

## 2021-05-28 PROBLEM — G62.9 NEUROPATHY: Status: ACTIVE | Noted: 2021-05-28

## 2022-02-08 PROBLEM — J44.9 CHRONIC OBSTRUCTIVE PULMONARY DISEASE (COPD) (HCC): Status: ACTIVE | Noted: 2021-12-06

## 2022-03-29 PROBLEM — F43.10 PTSD (POST-TRAUMATIC STRESS DISORDER): Status: ACTIVE | Noted: 2022-03-29

## 2022-03-29 PROBLEM — F32.A DEPRESSION: Status: ACTIVE | Noted: 2022-03-29

## 2022-10-27 ENCOUNTER — APPOINTMENT (OUTPATIENT)
Dept: NEUROLOGY | Facility: MEDICAL CENTER | Age: 57
End: 2022-10-27
Attending: NURSE PRACTITIONER

## 2023-04-12 PROBLEM — Z76.89 ENCOUNTER TO ESTABLISH CARE: Status: ACTIVE | Noted: 2023-04-12

## 2023-05-08 ENCOUNTER — OFFICE VISIT (OUTPATIENT)
Dept: ENDOCRINOLOGY | Facility: MEDICAL CENTER | Age: 58
End: 2023-05-08
Payer: MEDICARE

## 2023-05-08 VITALS
HEART RATE: 72 BPM | WEIGHT: 179 LBS | BODY MASS INDEX: 25.62 KG/M2 | DIASTOLIC BLOOD PRESSURE: 70 MMHG | HEIGHT: 70 IN | OXYGEN SATURATION: 98 % | SYSTOLIC BLOOD PRESSURE: 124 MMHG

## 2023-05-08 DIAGNOSIS — G62.9 NEUROPATHY: ICD-10-CM

## 2023-05-08 DIAGNOSIS — E78.5 DYSLIPIDEMIA: ICD-10-CM

## 2023-05-08 DIAGNOSIS — Z79.4 TYPE 2 DIABETES MELLITUS WITH HYPERGLYCEMIA, WITH LONG-TERM CURRENT USE OF INSULIN (HCC): ICD-10-CM

## 2023-05-08 DIAGNOSIS — E03.9 PRIMARY HYPOTHYROIDISM: ICD-10-CM

## 2023-05-08 DIAGNOSIS — E55.9 VITAMIN D DEFICIENCY: ICD-10-CM

## 2023-05-08 DIAGNOSIS — E11.65 TYPE 2 DIABETES MELLITUS WITH HYPERGLYCEMIA, WITH LONG-TERM CURRENT USE OF INSULIN (HCC): ICD-10-CM

## 2023-05-08 LAB — RETINAL SCREEN: NEGATIVE

## 2023-05-08 PROCEDURE — 99213 OFFICE O/P EST LOW 20 MIN: CPT

## 2023-05-08 PROCEDURE — 99205 OFFICE O/P NEW HI 60 MIN: CPT

## 2023-05-08 PROCEDURE — 92250 FUNDUS PHOTOGRAPHY W/I&R: CPT

## 2023-05-08 RX ORDER — PIOGLITAZONEHYDROCHLORIDE 15 MG/1
15 TABLET ORAL DAILY
Qty: 30 TABLET | Refills: 11 | Status: SHIPPED | OUTPATIENT
Start: 2023-05-08 | End: 2023-06-20

## 2023-05-08 RX ORDER — PROCHLORPERAZINE 25 MG/1
1 SUPPOSITORY RECTAL
Qty: 9 EACH | Refills: 3 | Status: SHIPPED | OUTPATIENT
Start: 2023-05-08 | End: 2023-05-24

## 2023-05-08 RX ORDER — PROCHLORPERAZINE 25 MG/1
1 SUPPOSITORY RECTAL
Qty: 1 EACH | Refills: 3 | Status: SHIPPED | OUTPATIENT
Start: 2023-05-08 | End: 2023-05-24

## 2023-05-08 RX ORDER — EMPAGLIFLOZIN 10 MG/1
10 TABLET, FILM COATED ORAL DAILY
Qty: 90 TABLET | Refills: 3 | Status: SHIPPED | OUTPATIENT
Start: 2023-05-08 | End: 2023-06-06

## 2023-05-08 ASSESSMENT — FIBROSIS 4 INDEX: FIB4 SCORE: 1.48
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
## 2023-05-09 ENCOUNTER — TELEPHONE (OUTPATIENT)
Dept: ENDOCRINOLOGY | Facility: MEDICAL CENTER | Age: 58
End: 2023-05-09

## 2023-05-09 NOTE — TELEPHONE ENCOUNTER
Patients insurance covers the Dexcom through her pharmacy.  Can you please submit an order to [employer] for the , sensors and transmitter?    Can you please submit it urgently?    Thanks you!!

## 2023-05-18 ENCOUNTER — TELEPHONE (OUTPATIENT)
Dept: ENDOCRINOLOGY | Facility: MEDICAL CENTER | Age: 58
End: 2023-05-18
Payer: MEDICARE

## 2023-05-18 NOTE — TELEPHONE ENCOUNTER
Can you please send a refill for Dexcom G7? Spoke to Davin from [employer] and he said that she has 100% coverage for the Dexcom G6. He says that once the new order is in, we can push the authorization through [place] (has to be new order per [employer]).    April was currently working with this patient prior to this call as well as Amy.  CB: [phone] for [facility]

## 2023-05-22 DIAGNOSIS — E11.65 TYPE 2 DIABETES MELLITUS WITH HYPERGLYCEMIA, WITH LONG-TERM CURRENT USE OF INSULIN (HCC): ICD-10-CM

## 2023-05-22 DIAGNOSIS — Z79.4 TYPE 2 DIABETES MELLITUS WITH HYPERGLYCEMIA, WITH LONG-TERM CURRENT USE OF INSULIN (HCC): ICD-10-CM

## 2023-05-23 ENCOUNTER — TELEPHONE (OUTPATIENT)
Dept: ENDOCRINOLOGY | Facility: MEDICAL CENTER | Age: 58
End: 2023-05-23
Payer: MEDICARE

## 2023-05-23 NOTE — TELEPHONE ENCOUNTER
[employer] Rep is requesting to place a new order via parachute for patient to get Dexcom G7 instead of G6. They are helping patient apply for financial assistance. They will communicate with patient.

## 2023-05-24 ENCOUNTER — TELEPHONE (OUTPATIENT)
Dept: ENDOCRINOLOGY | Facility: MEDICAL CENTER | Age: 58
End: 2023-05-24

## 2023-05-24 DIAGNOSIS — Z79.4 TYPE 2 DIABETES MELLITUS WITH HYPERGLYCEMIA, WITH LONG-TERM CURRENT USE OF INSULIN (HCC): ICD-10-CM

## 2023-05-24 DIAGNOSIS — E11.65 TYPE 2 DIABETES MELLITUS WITH HYPERGLYCEMIA, WITH LONG-TERM CURRENT USE OF INSULIN (HCC): ICD-10-CM

## 2023-05-24 RX ORDER — PROCHLORPERAZINE 25 MG/1
1 SUPPOSITORY RECTAL
Qty: 9 EACH | Refills: 3 | Status: SHIPPED | OUTPATIENT
Start: 2023-05-24 | End: 2023-06-02

## 2023-05-24 RX ORDER — PROCHLORPERAZINE 25 MG/1
1 SUPPOSITORY RECTAL
Qty: 2 EACH | Refills: 2 | Status: SHIPPED | OUTPATIENT
Start: 2023-05-24 | End: 2023-06-06 | Stop reason: CLARIF

## 2023-05-24 NOTE — TELEPHONE ENCOUNTER
Patient is calling to request a new prescription for Dexcom G6 Package to be sent to [facility] in [address]. She no longer wants to go through [employer].
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
## 2023-05-24 NOTE — TELEPHONE ENCOUNTER
Patient has been asking for this order to be rushed, and we have requested [employer] to complete this order as soon as possible as requested by the patient. [employer] need to be in contact with patient to proceed with the order as they need to confirm insurance information, delivery address and payment options as well as confirming with the order.     [employer] is trying to help patient with financial assistance for this order, and this may need more information from patient.

## 2023-06-02 ENCOUNTER — TELEPHONE (OUTPATIENT)
Dept: ENDOCRINOLOGY | Facility: MEDICAL CENTER | Age: 58
End: 2023-06-02
Payer: MEDICARE

## 2023-06-02 DIAGNOSIS — E11.65 TYPE 2 DIABETES MELLITUS WITH HYPERGLYCEMIA, WITH LONG-TERM CURRENT USE OF INSULIN (HCC): ICD-10-CM

## 2023-06-02 DIAGNOSIS — Z79.4 TYPE 2 DIABETES MELLITUS WITH HYPERGLYCEMIA, WITH LONG-TERM CURRENT USE OF INSULIN (HCC): ICD-10-CM

## 2023-06-02 RX ORDER — INSULIN LISPRO 100 [IU]/ML
15 INJECTION, SOLUTION INTRAVENOUS; SUBCUTANEOUS
Qty: 45 ML | Refills: 3 | Status: SHIPPED | OUTPATIENT
Start: 2023-06-02 | End: 2023-07-13 | Stop reason: SDUPTHER

## 2023-06-02 NOTE — TELEPHONE ENCOUNTER
Patient would like lion instead of dexcom 6 since her insurance covers it.       Patient would also like for you to know that she stopped taking Jardiance because it was making her sick & she had 3 yeast infections in a week.

## 2023-06-02 NOTE — TELEPHONE ENCOUNTER
Patient called because humalog alycia pen sent to [facility] in [place]. The pharmacy tried to give her vials, but she's never used vial before.

## 2023-06-06 DIAGNOSIS — Z79.4 TYPE 2 DIABETES MELLITUS WITH HYPERGLYCEMIA, WITH LONG-TERM CURRENT USE OF INSULIN (HCC): ICD-10-CM

## 2023-06-06 DIAGNOSIS — E11.65 TYPE 2 DIABETES MELLITUS WITH HYPERGLYCEMIA, WITH LONG-TERM CURRENT USE OF INSULIN (HCC): ICD-10-CM

## 2023-06-20 ENCOUNTER — OFFICE VISIT (OUTPATIENT)
Dept: ENDOCRINOLOGY | Facility: MEDICAL CENTER | Age: 58
End: 2023-06-20
Payer: MEDICARE

## 2023-06-20 VITALS
OXYGEN SATURATION: 98 % | WEIGHT: 194 LBS | BODY MASS INDEX: 27.77 KG/M2 | DIASTOLIC BLOOD PRESSURE: 84 MMHG | SYSTOLIC BLOOD PRESSURE: 130 MMHG | HEART RATE: 88 BPM | HEIGHT: 70 IN

## 2023-06-20 DIAGNOSIS — E55.9 VITAMIN D DEFICIENCY: ICD-10-CM

## 2023-06-20 DIAGNOSIS — E78.5 DYSLIPIDEMIA: ICD-10-CM

## 2023-06-20 DIAGNOSIS — E03.9 PRIMARY HYPOTHYROIDISM: ICD-10-CM

## 2023-06-20 DIAGNOSIS — Z79.4 TYPE 2 DIABETES MELLITUS WITH HYPERGLYCEMIA, WITH LONG-TERM CURRENT USE OF INSULIN (HCC): ICD-10-CM

## 2023-06-20 DIAGNOSIS — G62.9 NEUROPATHY: ICD-10-CM

## 2023-06-20 DIAGNOSIS — E11.65 TYPE 2 DIABETES MELLITUS WITH HYPERGLYCEMIA, WITH LONG-TERM CURRENT USE OF INSULIN (HCC): ICD-10-CM

## 2023-06-20 PROCEDURE — 99214 OFFICE O/P EST MOD 30 MIN: CPT

## 2023-06-20 PROCEDURE — 99213 OFFICE O/P EST LOW 20 MIN: CPT

## 2023-06-20 PROCEDURE — 3075F SYST BP GE 130 - 139MM HG: CPT

## 2023-06-20 PROCEDURE — 3079F DIAST BP 80-89 MM HG: CPT

## 2023-06-20 RX ORDER — PIOGLITAZONEHYDROCHLORIDE 15 MG/1
30 TABLET ORAL DAILY
Qty: 180 TABLET | Refills: 3 | Status: SHIPPED | OUTPATIENT
Start: 2023-06-20 | End: 2023-12-14

## 2023-06-20 RX ORDER — INSULIN DEGLUDEC 100 U/ML
15 INJECTION, SOLUTION SUBCUTANEOUS
Qty: 9 ML | Refills: 5 | Status: SHIPPED | OUTPATIENT
Start: 2023-06-20 | End: 2023-06-23 | Stop reason: SDUPTHER

## 2023-06-20 ASSESSMENT — FIBROSIS 4 INDEX: FIB4 SCORE: 1.48
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
## 2023-06-20 NOTE — PROGRESS NOTES
"RN-CDE Note    Subjective:   Endocrinology Clinic Progress Note  PCP: YESI Stoddard    HPI:  Thomas Guy is a 57 y.o. old patient who is seen today by the Diabetes Nurse Specialist for review of her uncontrolled type 2 diabetes with long term use of insulin.  States she is having issues with the Lantus insulin, states that her stomach doesn't digest food and that her face went numb and paralyzed on one side.  States she was taking Lantus in the past and prior to her brain injury.  States she had seen a GI doctor and it was found that the Lantus was causing this.    She was given some Jardiance samples at her last visit, she developed 3 vaginal yeast infections so she stopped taking.   She states about a week ago she had a incident in which her body became very hot and sweaty and tachycardic.   States about a week ago her face went numb along with her tongue and lips, this lasted about 15-20 minutes.  She called her neurologist because she thought she was having a stroke.   States she felt better when her blood sugars were running in the 500 range.   She also is upset that her weight has gone up.   DM:   Last A1c:   Lab Results   Component Value Date/Time    HBA1C 11.9 (H) 05/05/2023 08:10 AM      Previous A1c was 11.9 on 5/5/23  A1C GOAL: < 7    Diabetes Medications:   Pioglitazone 15 mg daily  Humalog 5 units with meals (her  cut back from 10 units due to hypoglycemia)  Lantus 40 units daily ( her  states she was previously on split dose of 10 and 15 units and wasn't having issues.  She is refusing to take Lantus anymore)      Exercise: states she is active throughout the day.   Diet: \"healthy\" diet  in general  Patient's body mass index is unknown because there is no height or weight on file. Exercise and nutrition counseling were performed at this visit.    Glucose monitoring frequency: Using Mica for the past 3-4 days.     Hypoglycemic episodes: yes -   Last Retinal Exam: on " file and up-to-date  Foot Exam:  Monofilament: current  Lab Results   Component Value Date/Time    MALBCRT 9 05/05/2023 08:10 AM    MICROALBUR 8.8 05/05/2023 08:10 AM        ACR Albumin/Creatinine Ratio goal <30     HTN:   Blood pressure goal <130/<80 .   Currently Rx ACE/ARB: Not Indicated     Dyslipidemia:    Lab Results   Component Value Date/Time    CHOLSTRLTOT 226 (H) 05/05/2023 08:10 AM     (H) 05/05/2023 08:10 AM    HDL 74 (H) 05/05/2023 08:10 AM    TRIGLYCERIDE 108 05/05/2023 08:10 AM         Currently Rx Statin:  no    She  reports that she has been smoking cigarettes. She has a 25.00 pack-year smoking history. She has never used smokeless tobacco.      Plan:     Discussed and educated on:   - All medications, side effects and compliance (discussed carefully)  - HbA1C:   - Home glucose monitoring emphasized  - Weight control and daily exercise    Recommended medication changes: Stop Lantus.  Willing to try a different insulin at a lower dose.

## 2023-06-23 ENCOUNTER — TELEPHONE (OUTPATIENT)
Dept: ENDOCRINOLOGY | Facility: MEDICAL CENTER | Age: 58
End: 2023-06-23
Payer: MEDICARE

## 2023-06-23 DIAGNOSIS — Z79.4 TYPE 2 DIABETES MELLITUS WITH HYPERGLYCEMIA, WITH LONG-TERM CURRENT USE OF INSULIN (HCC): ICD-10-CM

## 2023-06-23 DIAGNOSIS — E11.65 TYPE 2 DIABETES MELLITUS WITH HYPERGLYCEMIA, WITH LONG-TERM CURRENT USE OF INSULIN (HCC): ICD-10-CM

## 2023-06-23 RX ORDER — INSULIN DEGLUDEC 100 U/ML
15 INJECTION, SOLUTION SUBCUTANEOUS
Qty: 9 ML | Refills: 5 | Status: SHIPPED | OUTPATIENT
Start: 2023-06-23 | End: 2023-07-13 | Stop reason: SDUPTHER

## 2023-06-23 NOTE — TELEPHONE ENCOUNTER
Patent needs Tresiba sent to [facility] since she's soon to be out and it was sent to [facility] instead.

## 2023-07-11 ENCOUNTER — TELEPHONE (OUTPATIENT)
Dept: ENDOCRINOLOGY | Facility: MEDICAL CENTER | Age: 58
End: 2023-07-11

## 2023-07-11 NOTE — TELEPHONE ENCOUNTER
7/11/23 Good morning, patient just called and stated that we sent her prescription refill for her Novolog 100unit/ml flexpens to the wrong pharmacy, she would like them sent to the [facility] in [place], she said she is very sick without it     Thank you

## 2023-08-01 ENCOUNTER — TELEPHONE (OUTPATIENT)
Dept: ENDOCRINOLOGY | Facility: MEDICAL CENTER | Age: 58
End: 2023-08-01
Payer: MEDICARE

## 2023-09-01 ENCOUNTER — DOCUMENTATION (OUTPATIENT)
Dept: ENDOCRINOLOGY | Facility: MEDICAL CENTER | Age: 58
End: 2023-09-01
Payer: MEDICARE

## 2023-09-01 NOTE — PROGRESS NOTES
Patient with T2DM and last A1c was elevated as seen below.     Lab Results   Component Value Date/Time    HBA1C 11.9 (H) 05/05/2023 08:10 AM      Patient has an appt with ZOILA Hammer and Yen Welsh RN on 09/12/23.    Pending repeat A1c, patient may benefit from a Referral to Pharmacotherapy Service for diabetes management. If appropriate, please consider placing a referral and please have the patient schedule an appointment with the clinical pharmacist when checking out for their appointment on 09/12/23. Pharmacotherapy visits are conducted at the Endocrinology Clinic every Friday. Thank you!    Alex Ferreira, NolaD, BCACP

## 2023-09-07 ENCOUNTER — TELEPHONE (OUTPATIENT)
Dept: ENDOCRINOLOGY | Facility: MEDICAL CENTER | Age: 58
End: 2023-09-07
Payer: MEDICARE

## 2023-09-07 NOTE — TELEPHONE ENCOUNTER
Received a message from patient needing to reschedule due to not feeling well. I was unable to reach the patient and unable to leave a message due to the mailbox being full

## 2023-09-21 PROBLEM — L85.1 ACQUIRED KERATODERMA PALMARIS ET PLANTARIS: Status: ACTIVE | Noted: 2023-06-21

## 2023-09-21 PROBLEM — I73.9 PERIPHERAL VASCULAR DISEASE (HCC): Status: ACTIVE | Noted: 2023-06-21

## 2023-09-21 PROBLEM — E11.42 DIABETIC PERIPHERAL NEUROPATHY (HCC): Status: ACTIVE | Noted: 2023-06-21

## 2023-09-21 PROBLEM — F17.200 NICOTINE DEPENDENCE: Status: ACTIVE | Noted: 2023-06-21

## 2023-09-22 ENCOUNTER — TELEPHONE (OUTPATIENT)
Dept: ENDOCRINOLOGY | Facility: MEDICAL CENTER | Age: 58
End: 2023-09-22
Payer: MEDICARE

## 2023-09-22 NOTE — TELEPHONE ENCOUNTER
Pt called office and left vm stating that she needed to reschedule her appt that is on 09/26/23 because she will have to go into the hospital that day. Tried to call her back, but it is stating that her phone number is out of service. Cancelling apt and pt will need to call to get rescheduled.

## 2023-09-26 ENCOUNTER — APPOINTMENT (OUTPATIENT)
Dept: ENDOCRINOLOGY | Facility: MEDICAL CENTER | Age: 58
End: 2023-09-26
Payer: MEDICAID

## 2023-09-28 PROBLEM — S06.0XAA CONCUSSION: Status: RESOLVED | Noted: 2019-11-14 | Resolved: 2023-09-28

## 2023-09-28 PROBLEM — M62.830 SPASM OF MUSCLE OF LOWER BACK: Status: RESOLVED | Noted: 2019-11-15 | Resolved: 2023-09-28

## 2023-09-28 PROBLEM — Z76.89 ENCOUNTER TO ESTABLISH CARE: Status: RESOLVED | Noted: 2023-04-12 | Resolved: 2023-09-28

## 2023-09-28 PROBLEM — G47.33 OSA (OBSTRUCTIVE SLEEP APNEA): Status: ACTIVE | Noted: 2023-09-21

## 2023-09-28 PROBLEM — G44.329 CHRONIC POST-TRAUMATIC HEADACHE, NOT INTRACTABLE: Status: RESOLVED | Noted: 2020-03-11 | Resolved: 2023-09-28

## 2023-09-28 PROBLEM — E78.2 MIXED HYPERLIPIDEMIA: Status: RESOLVED | Noted: 2021-01-15 | Resolved: 2023-09-28

## 2023-09-28 PROBLEM — R45.4 IRRITABILITY AND ANGER: Status: RESOLVED | Noted: 2019-07-19 | Resolved: 2023-09-28

## 2023-10-06 ENCOUNTER — TELEPHONE (OUTPATIENT)
Dept: ENDOCRINOLOGY | Facility: MEDICAL CENTER | Age: 58
End: 2023-10-06
Payer: MEDICARE

## 2023-10-06 DIAGNOSIS — Z79.4 TYPE 2 DIABETES MELLITUS WITH HYPERGLYCEMIA, WITH LONG-TERM CURRENT USE OF INSULIN (HCC): ICD-10-CM

## 2023-10-06 DIAGNOSIS — E11.65 TYPE 2 DIABETES MELLITUS WITH HYPERGLYCEMIA, WITH LONG-TERM CURRENT USE OF INSULIN (HCC): ICD-10-CM

## 2023-10-06 RX ORDER — INSULIN DEGLUDEC 100 U/ML
15 INJECTION, SOLUTION SUBCUTANEOUS
Qty: 9 ML | Refills: 3 | Status: SHIPPED | OUTPATIENT
Start: 2023-10-06 | End: 2024-01-26 | Stop reason: SDUPTHER

## 2023-10-06 NOTE — TELEPHONE ENCOUNTER
Received request via: Patient    Was the patient seen in the last year in this department? Yes    Does the patient have an active prescription (recently filled or refills available) for medication(s) requested? No    Does the patient have [facility] and need 100 day supply (blood pressure, diabetes and cholesterol meds only)? Patient does not have SCP    Tresiba taking 15 units at bedtime.

## 2023-10-10 ENCOUNTER — TELEPHONE (OUTPATIENT)
Dept: ENDOCRINOLOGY | Facility: MEDICAL CENTER | Age: 58
End: 2023-10-10
Payer: MEDICARE

## 2023-10-10 NOTE — TELEPHONE ENCOUNTER
Patient called because she needs prior auth  For Tresiba 100 u/ml (she said both quick and long-lasting) at [facility] in [facility]. She gave a phone # to call: [phone]

## 2023-10-11 ENCOUNTER — TELEPHONE (OUTPATIENT)
Dept: ENDOCRINOLOGY | Facility: MEDICAL CENTER | Age: 58
End: 2023-10-11
Payer: MEDICARE

## 2023-10-11 NOTE — TELEPHONE ENCOUNTER
Prior Authorization for Tresiba Flextouch 100 unit/ml Pen-inj. (Quantity: 9mls, Days: 60) has been submitted via Cover My Meds: Key ([id number])    Insurance: [employer]    Received response of Medication is available without authorization.          Next Steps- Forwarding to liaison team for them to reach out to medical staff.

## 2023-10-11 NOTE — TELEPHONE ENCOUNTER
Received PA request via  patient request  for Tresiba Flextouch 100 unit/ml Pen-inj. (Quantity:9mls, Day Supply:60)     Insurance:[employer]  Member ID:[id number]  BIN: [id number]  PCN: [id number]    Ran Test claim via [place] [facility] medication Rejects stating prior authorization is required.

## 2023-10-31 ENCOUNTER — TELEPHONE (OUTPATIENT)
Dept: ENDOCRINOLOGY | Facility: MEDICAL CENTER | Age: 58
End: 2023-10-31
Payer: MEDICARE

## 2023-10-31 ENCOUNTER — PHARMACY VISIT (OUTPATIENT)
Dept: PHARMACY | Facility: MEDICAL CENTER | Age: 58
End: 2023-10-31
Payer: COMMERCIAL

## 2023-10-31 ENCOUNTER — OFFICE VISIT (OUTPATIENT)
Dept: ENDOCRINOLOGY | Facility: MEDICAL CENTER | Age: 58
End: 2023-10-31
Payer: MEDICARE

## 2023-10-31 VITALS
OXYGEN SATURATION: 100 % | DIASTOLIC BLOOD PRESSURE: 78 MMHG | HEIGHT: 70 IN | WEIGHT: 209.5 LBS | BODY MASS INDEX: 29.99 KG/M2 | SYSTOLIC BLOOD PRESSURE: 158 MMHG | HEART RATE: 80 BPM

## 2023-10-31 DIAGNOSIS — G62.9 NEUROPATHY: ICD-10-CM

## 2023-10-31 DIAGNOSIS — Z79.4 TYPE 2 DIABETES MELLITUS WITH HYPERGLYCEMIA, WITH LONG-TERM CURRENT USE OF INSULIN (HCC): ICD-10-CM

## 2023-10-31 DIAGNOSIS — E55.9 VITAMIN D DEFICIENCY: ICD-10-CM

## 2023-10-31 DIAGNOSIS — E78.5 DYSLIPIDEMIA: ICD-10-CM

## 2023-10-31 DIAGNOSIS — E03.9 PRIMARY HYPOTHYROIDISM: ICD-10-CM

## 2023-10-31 DIAGNOSIS — E11.65 TYPE 2 DIABETES MELLITUS WITH HYPERGLYCEMIA, WITH LONG-TERM CURRENT USE OF INSULIN (HCC): ICD-10-CM

## 2023-10-31 LAB
HBA1C MFR BLD: 7.1 % (ref ?–5.8)
POCT INT CON NEG: NEGATIVE
POCT INT CON POS: POSITIVE

## 2023-10-31 PROCEDURE — RXMED WILLOW AMBULATORY MEDICATION CHARGE

## 2023-10-31 PROCEDURE — 3078F DIAST BP <80 MM HG: CPT

## 2023-10-31 PROCEDURE — 99213 OFFICE O/P EST LOW 20 MIN: CPT

## 2023-10-31 PROCEDURE — 99214 OFFICE O/P EST MOD 30 MIN: CPT

## 2023-10-31 PROCEDURE — 83036 HEMOGLOBIN GLYCOSYLATED A1C: CPT

## 2023-10-31 PROCEDURE — 3077F SYST BP >= 140 MM HG: CPT

## 2023-10-31 RX ORDER — TIRZEPATIDE 2.5 MG/.5ML
2.5 INJECTION, SOLUTION SUBCUTANEOUS
Qty: 2 ML | Refills: 0 | Status: SHIPPED | OUTPATIENT
Start: 2023-10-31 | End: 2023-10-31

## 2023-10-31 RX ORDER — TIRZEPATIDE 2.5 MG/.5ML
2.5 INJECTION, SOLUTION SUBCUTANEOUS
Qty: 2 ML | Refills: 0 | Status: SHIPPED | OUTPATIENT
Start: 2023-10-31 | End: 2024-01-26

## 2023-10-31 ASSESSMENT — FIBROSIS 4 INDEX: FIB4 SCORE: 1.45

## 2023-10-31 NOTE — PROGRESS NOTES
Endocrinology Clinic Progress Note  PCP: Joshua Infante D.O.    HPI:  Thomas Guy is a 58 y.o. old patient who is seen today by the Diabetes Nurse Specialist for review of their diabetes.  She is upset that she is gaining weight and wants off of the insulin.  She is afraid that with the weight gain she will need to see the cardiologist again.    Recent changes in health: states she was told by her PCP she has peripheral artery disease and was told if she doesn't get her diabetes under control she will probably lose some of her toes.   States she has been depressed recently and hasn't been testing her blood sugars.   States she has been referred to pulmonology for possible COPD and referred to psychiatry for PTSD.   DM:   Last A1c:   Lab Results   Component Value Date/Time    HBA1C 11.9 (H) 05/05/2023 08:10 AM      Previous A1c was 11.9 on 5/5/23  A1C GOAL: < 7    Diabetes Medications:   Actos 15 mg daily  NOT TAKING  Tresiba 20 units bid  Novolog 15 units with meals      Exercise: states she is constantly up and active throughout the day.   Diet: she states she barely eats.   Patient's body mass index is 30.06 kg/m². Exercise and nutrition counseling were performed at this visit.    Glucose monitoring frequency: has not been testing her blood sugars.  Was using the the Mica but states her reader malfunctioned.  She contacted the company and she is supposed to be receiving a replacement reader.   States she lost her dog and has been to depressed to do FSBS testing.         Hypoglycemic episodes: no  Last Retinal Exam: on file and up-to-date    Foot Exam:  Monofilament: current  Her feet are discolored (reddish purple).  Complaining of pain in her feet.      Plan:     Discussed and educated on:   - All medications, side effects and compliance (discussed carefully)  - Annual eye examinations at [facility]  - Foot Care: what to look for when checking feet every day  - HbA1C: target  - Home glucose  monitoring emphasized  - Weight control and daily exercise    Recommended medication changes: none

## 2023-10-31 NOTE — TELEPHONE ENCOUNTER
Received request from provider to investigate what GLP-1 is covered under patients Medicare plan (Mounjaro vs. Ozempic). I was not able to run test claim through [facility] as patient is a [employer] recipient.     I called [employer] at [phone] and spoke with representative Ras. She stated that the Mounjaro 2.5mg (2mL/28DS) is covered with a $0 copay, no PA required. [Call reference #: [id number]]    Advised provider Pan Fatima of the outcome.     Adriana Coombs  RX Coordinator/Liaison
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
## 2023-12-05 ENCOUNTER — APPOINTMENT (OUTPATIENT)
Dept: ENDOCRINOLOGY | Facility: MEDICAL CENTER | Age: 58
End: 2023-12-05
Payer: COMMERCIAL

## 2023-12-13 ENCOUNTER — TELEPHONE (OUTPATIENT)
Dept: ENDOCRINOLOGY | Facility: MEDICAL CENTER | Age: 58
End: 2023-12-13
Payer: MEDICARE
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
## 2023-12-14 ENCOUNTER — OFFICE VISIT (OUTPATIENT)
Dept: ENDOCRINOLOGY | Facility: MEDICAL CENTER | Age: 58
End: 2023-12-14
Payer: COMMERCIAL

## 2023-12-14 VITALS
HEART RATE: 65 BPM | HEIGHT: 71 IN | OXYGEN SATURATION: 93 % | WEIGHT: 210 LBS | SYSTOLIC BLOOD PRESSURE: 130 MMHG | BODY MASS INDEX: 29.4 KG/M2 | DIASTOLIC BLOOD PRESSURE: 84 MMHG

## 2023-12-14 DIAGNOSIS — Z79.4 TYPE 2 DIABETES MELLITUS WITH HYPERGLYCEMIA, WITH LONG-TERM CURRENT USE OF INSULIN (HCC): ICD-10-CM

## 2023-12-14 DIAGNOSIS — E55.9 VITAMIN D DEFICIENCY: ICD-10-CM

## 2023-12-14 DIAGNOSIS — E03.9 PRIMARY HYPOTHYROIDISM: ICD-10-CM

## 2023-12-14 DIAGNOSIS — E11.65 TYPE 2 DIABETES MELLITUS WITH HYPERGLYCEMIA, WITH LONG-TERM CURRENT USE OF INSULIN (HCC): ICD-10-CM

## 2023-12-14 DIAGNOSIS — G62.9 NEUROPATHY: ICD-10-CM

## 2023-12-14 DIAGNOSIS — E78.5 DYSLIPIDEMIA: ICD-10-CM

## 2023-12-14 PROCEDURE — 99211 OFF/OP EST MAY X REQ PHY/QHP: CPT

## 2023-12-14 PROCEDURE — 3075F SYST BP GE 130 - 139MM HG: CPT

## 2023-12-14 PROCEDURE — 99215 OFFICE O/P EST HI 40 MIN: CPT

## 2023-12-14 PROCEDURE — 3079F DIAST BP 80-89 MM HG: CPT

## 2023-12-14 RX ORDER — PIOGLITAZONEHYDROCHLORIDE 30 MG/1
30 TABLET ORAL DAILY
Qty: 90 TABLET | Refills: 3 | Status: SHIPPED | OUTPATIENT
Start: 2023-12-14 | End: 2024-02-10

## 2023-12-14 RX ORDER — METFORMIN HYDROCHLORIDE 500 MG/1
1000 TABLET, EXTENDED RELEASE ORAL 2 TIMES DAILY
Qty: 360 TABLET | Refills: 3 | Status: SHIPPED | OUTPATIENT
Start: 2023-12-14 | End: 2023-12-15

## 2023-12-14 ASSESSMENT — FIBROSIS 4 INDEX: FIB4 SCORE: 1.45

## 2023-12-15 ENCOUNTER — TELEPHONE (OUTPATIENT)
Dept: ENDOCRINOLOGY | Facility: MEDICAL CENTER | Age: 58
End: 2023-12-15
Payer: MEDICARE

## 2023-12-15 DIAGNOSIS — E11.65 TYPE 2 DIABETES MELLITUS WITH HYPERGLYCEMIA, WITH LONG-TERM CURRENT USE OF INSULIN (HCC): ICD-10-CM

## 2023-12-15 DIAGNOSIS — Z79.4 TYPE 2 DIABETES MELLITUS WITH HYPERGLYCEMIA, WITH LONG-TERM CURRENT USE OF INSULIN (HCC): ICD-10-CM

## 2023-12-15 RX ORDER — METFORMIN HYDROCHLORIDE 500 MG/1
500 TABLET, EXTENDED RELEASE ORAL DAILY
Qty: 30 TABLET | Refills: 0 | Status: SHIPPED | OUTPATIENT
Start: 2023-12-15 | End: 2024-01-18

## 2023-12-15 NOTE — TELEPHONE ENCOUNTER
Pt left voicemail regarding her prescription for metformin. She tried to pick it up, but the pharmacist told her that it was an extremely high dosage and they would not release it to her. She said she needed someone to call the pharmacy to verify her prescription. The phone number is [phone] for the pharmacy.

## 2023-12-15 NOTE — TELEPHONE ENCOUNTER
Patient called in again for a follow up of message below. Patient was advised Moiz is the only provider in office today, she will need to look into the chart and look into the medication.     Relayed message to Moiz, that patient is being old that the dose of Metformin is exteremly high and the pharmacy is not dispensing the medication.

## 2023-12-15 NOTE — TELEPHONE ENCOUNTER
Called patients [facility] pharmacy. Spoke to staff who states that they are questioning this medication because it was extended release that was prescribed as well as if the patient has not taken this medication before should she be started on a lower dose.

## 2023-12-16 NOTE — PROGRESS NOTES
Pt called to informed that pharmacy did not release her ER Metformin due to higher dosages  Metformin  mg daily sent to pharmacy  Staff will calling patient to inform her.    52

## 2023-12-19 ENCOUNTER — TELEPHONE (OUTPATIENT)
Dept: ENDOCRINOLOGY | Facility: MEDICAL CENTER | Age: 58
End: 2023-12-19

## 2023-12-19 NOTE — TELEPHONE ENCOUNTER
Patient called in, left a voicemail stating that she started the metformin that was ordered by Moiz. She states that she has had diarrhea and gut pain. She states this is the worst diarrhea she has ever had.

## 2024-01-17 DIAGNOSIS — E11.65 TYPE 2 DIABETES MELLITUS WITH HYPERGLYCEMIA, WITH LONG-TERM CURRENT USE OF INSULIN (HCC): ICD-10-CM

## 2024-01-17 DIAGNOSIS — Z79.4 TYPE 2 DIABETES MELLITUS WITH HYPERGLYCEMIA, WITH LONG-TERM CURRENT USE OF INSULIN (HCC): ICD-10-CM

## 2024-01-18 ENCOUNTER — TELEPHONE (OUTPATIENT)
Dept: ENDOCRINOLOGY | Facility: MEDICAL CENTER | Age: 59
End: 2024-01-18
Payer: MEDICARE

## 2024-01-18 DIAGNOSIS — E11.65 TYPE 2 DIABETES MELLITUS WITH HYPERGLYCEMIA, WITH LONG-TERM CURRENT USE OF INSULIN (HCC): ICD-10-CM

## 2024-01-18 DIAGNOSIS — Z79.4 TYPE 2 DIABETES MELLITUS WITH HYPERGLYCEMIA, WITH LONG-TERM CURRENT USE OF INSULIN (HCC): ICD-10-CM

## 2024-01-18 RX ORDER — METFORMIN HYDROCHLORIDE 500 MG/1
500 TABLET, EXTENDED RELEASE ORAL DAILY
Qty: 30 TABLET | Refills: 0 | Status: SHIPPED | OUTPATIENT
Start: 2024-01-18 | End: 2024-01-26

## 2024-01-18 NOTE — TELEPHONE ENCOUNTER
"Patient is dizzy and feeling sick from metformin. She called to request to be switched to some other pill.     Plan:  1. Stop Metformin.   2. If \"stomach upset\" persists -> ER or evaluation by PCP/GI  3. Start Jardiance 25 mg (we can not check the cost of the medication as her insurance is [employer])     - encourage adequate hydration, as medication can cause increased urination and dehydration     - if symptoms of UTI/burning sensation with urination -> stop the medication and report the office     - while on the medication patient should have some food containing CHO  4. Overall reassure the patient, as short term mild blood glucose elevation (< 200s) should not cause significant damage.   "

## 2024-01-19 RX ORDER — EMPAGLIFLOZIN 25 MG/1
1 TABLET, FILM COATED ORAL DAILY
Qty: 30 TABLET | Refills: 3 | Status: SHIPPED | OUTPATIENT
Start: 2024-01-19 | End: 2024-02-08

## 2024-05-10 ENCOUNTER — RESEARCH ENCOUNTER (OUTPATIENT)
Dept: RESEARCH | Facility: MEDICAL CENTER | Age: 59
End: 2024-05-10
Payer: MEDICARE

## 2024-06-20 PROBLEM — I73.9 PERIPHERAL VASCULAR DISEASE (HCC): Status: RESOLVED | Noted: 2023-06-21 | Resolved: 2024-06-20

## 2024-06-20 PROBLEM — G62.9 NEUROPATHY: Status: RESOLVED | Noted: 2021-05-28 | Resolved: 2024-06-20

## 2025-02-19 ENCOUNTER — TELEPHONE (OUTPATIENT)
Dept: ENDOCRINOLOGY | Facility: MEDICAL CENTER | Age: 60
End: 2025-02-19
Payer: MEDICAID

## 2025-02-20 NOTE — TELEPHONE ENCOUNTER
Spoke with patient regarding her referral and confirmed that she has [employer] Medicare advantage. I informed her that our office in not contracted with [facility] Medicare advantage. She asked why we don't accept it, and I stated again that our office is not contracted with [facility] for the medicare advantage plans. Patient stated she will call her PCP to look elsewhere and hung up while I was talking. The referral is now closed.